# Patient Record
Sex: MALE | Race: OTHER | HISPANIC OR LATINO | URBAN - METROPOLITAN AREA
[De-identification: names, ages, dates, MRNs, and addresses within clinical notes are randomized per-mention and may not be internally consistent; named-entity substitution may affect disease eponyms.]

---

## 2019-01-03 ENCOUNTER — EMERGENCY (EMERGENCY)
Facility: HOSPITAL | Age: 34
LOS: 1 days | Discharge: HOME | End: 2019-01-03
Attending: EMERGENCY MEDICINE | Admitting: EMERGENCY MEDICINE
Payer: COMMERCIAL

## 2019-01-03 VITALS
WEIGHT: 244.93 LBS | OXYGEN SATURATION: 97 % | HEIGHT: 72 IN | DIASTOLIC BLOOD PRESSURE: 84 MMHG | TEMPERATURE: 98 F | SYSTOLIC BLOOD PRESSURE: 144 MMHG | HEART RATE: 67 BPM | RESPIRATION RATE: 16 BRPM

## 2019-01-03 VITALS
HEART RATE: 59 BPM | RESPIRATION RATE: 15 BRPM | DIASTOLIC BLOOD PRESSURE: 87 MMHG | SYSTOLIC BLOOD PRESSURE: 142 MMHG | OXYGEN SATURATION: 100 % | TEMPERATURE: 98 F

## 2019-01-03 LAB
ALBUMIN SERPL ELPH-MCNC: 4.1 G/DL — SIGNIFICANT CHANGE UP (ref 3.3–5)
ALP SERPL-CCNC: 74 U/L — SIGNIFICANT CHANGE UP (ref 30–120)
ALT FLD-CCNC: 30 U/L DA — SIGNIFICANT CHANGE UP (ref 10–60)
ANION GAP SERPL CALC-SCNC: 9 MMOL/L — SIGNIFICANT CHANGE UP (ref 5–17)
APTT BLD: 35.4 SEC — SIGNIFICANT CHANGE UP (ref 27.5–36.3)
AST SERPL-CCNC: 17 U/L — SIGNIFICANT CHANGE UP (ref 10–40)
BASOPHILS # BLD AUTO: 0.02 K/UL — SIGNIFICANT CHANGE UP (ref 0–0.2)
BASOPHILS NFR BLD AUTO: 0.3 % — SIGNIFICANT CHANGE UP (ref 0–2)
BILIRUB SERPL-MCNC: 0.3 MG/DL — SIGNIFICANT CHANGE UP (ref 0.2–1.2)
BUN SERPL-MCNC: 24 MG/DL — HIGH (ref 7–23)
CALCIUM SERPL-MCNC: 9.2 MG/DL — SIGNIFICANT CHANGE UP (ref 8.4–10.5)
CHLORIDE SERPL-SCNC: 104 MMOL/L — SIGNIFICANT CHANGE UP (ref 96–108)
CO2 SERPL-SCNC: 29 MMOL/L — SIGNIFICANT CHANGE UP (ref 22–31)
CREAT SERPL-MCNC: 0.85 MG/DL — SIGNIFICANT CHANGE UP (ref 0.5–1.3)
EOSINOPHIL # BLD AUTO: 0.26 K/UL — SIGNIFICANT CHANGE UP (ref 0–0.5)
EOSINOPHIL NFR BLD AUTO: 3.6 % — SIGNIFICANT CHANGE UP (ref 0–6)
GLUCOSE SERPL-MCNC: 103 MG/DL — HIGH (ref 70–99)
HCT VFR BLD CALC: 42.4 % — SIGNIFICANT CHANGE UP (ref 39–50)
HGB BLD-MCNC: 14.6 G/DL — SIGNIFICANT CHANGE UP (ref 13–17)
IMM GRANULOCYTES NFR BLD AUTO: 0.3 % — SIGNIFICANT CHANGE UP (ref 0–1.5)
INR BLD: 1.08 RATIO — SIGNIFICANT CHANGE UP (ref 0.88–1.16)
LYMPHOCYTES # BLD AUTO: 1.73 K/UL — SIGNIFICANT CHANGE UP (ref 1–3.3)
LYMPHOCYTES # BLD AUTO: 23.6 % — SIGNIFICANT CHANGE UP (ref 13–44)
MCHC RBC-ENTMCNC: 29.7 PG — SIGNIFICANT CHANGE UP (ref 27–34)
MCHC RBC-ENTMCNC: 34.4 GM/DL — SIGNIFICANT CHANGE UP (ref 32–36)
MCV RBC AUTO: 86.4 FL — SIGNIFICANT CHANGE UP (ref 80–100)
MONOCYTES # BLD AUTO: 0.65 K/UL — SIGNIFICANT CHANGE UP (ref 0–0.9)
MONOCYTES NFR BLD AUTO: 8.9 % — SIGNIFICANT CHANGE UP (ref 2–14)
NEUTROPHILS # BLD AUTO: 4.64 K/UL — SIGNIFICANT CHANGE UP (ref 1.8–7.4)
NEUTROPHILS NFR BLD AUTO: 63.3 % — SIGNIFICANT CHANGE UP (ref 43–77)
NRBC # BLD: 0 /100 WBCS — SIGNIFICANT CHANGE UP (ref 0–0)
PLATELET # BLD AUTO: 207 K/UL — SIGNIFICANT CHANGE UP (ref 150–400)
POTASSIUM SERPL-MCNC: 4.3 MMOL/L — SIGNIFICANT CHANGE UP (ref 3.5–5.3)
POTASSIUM SERPL-SCNC: 4.3 MMOL/L — SIGNIFICANT CHANGE UP (ref 3.5–5.3)
PROT SERPL-MCNC: 7.5 G/DL — SIGNIFICANT CHANGE UP (ref 6–8.3)
PROTHROM AB SERPL-ACNC: 11.8 SEC — SIGNIFICANT CHANGE UP (ref 10–12.9)
RBC # BLD: 4.91 M/UL — SIGNIFICANT CHANGE UP (ref 4.2–5.8)
RBC # FLD: 12.1 % — SIGNIFICANT CHANGE UP (ref 10.3–14.5)
SODIUM SERPL-SCNC: 142 MMOL/L — SIGNIFICANT CHANGE UP (ref 135–145)
WBC # BLD: 7.32 K/UL — SIGNIFICANT CHANGE UP (ref 3.8–10.5)
WBC # FLD AUTO: 7.32 K/UL — SIGNIFICANT CHANGE UP (ref 3.8–10.5)

## 2019-01-03 PROCEDURE — 36415 COLL VENOUS BLD VENIPUNCTURE: CPT

## 2019-01-03 PROCEDURE — 99284 EMERGENCY DEPT VISIT MOD MDM: CPT

## 2019-01-03 PROCEDURE — 70482 CT ORBIT/EAR/FOSSA W/O&W/DYE: CPT | Mod: 26

## 2019-01-03 PROCEDURE — 80053 COMPREHEN METABOLIC PANEL: CPT

## 2019-01-03 PROCEDURE — 70482 CT ORBIT/EAR/FOSSA W/O&W/DYE: CPT

## 2019-01-03 PROCEDURE — 85730 THROMBOPLASTIN TIME PARTIAL: CPT

## 2019-01-03 PROCEDURE — 85027 COMPLETE CBC AUTOMATED: CPT

## 2019-01-03 PROCEDURE — 85610 PROTHROMBIN TIME: CPT

## 2019-01-03 PROCEDURE — 99284 EMERGENCY DEPT VISIT MOD MDM: CPT | Mod: 25

## 2019-01-03 NOTE — ED ADULT TRIAGE NOTE - CHIEF COMPLAINT QUOTE
swelling pain left upper eyelid for 2 months. Worse past 2 weeks. " swelling pain left upper eyelid for 2 months. Worse past 2 weeks. Left eye 20/20, Right eye 20/30 "

## 2019-01-03 NOTE — ED PROVIDER NOTE - OBJECTIVE STATEMENT
34 y/o M pt w/ no significant PMHx presents to the ED c/o L eye swelling x 1 month, worse with pain x 2 weeks. Seen by eye doctor at start of symptoms was Rxd 2 week course of amoxicillin, finished 2 weeks ago. Has not taken any antibiotics since. Was seen again by eye doctor last week for worsening symptoms, referred to [facility] Dr. Musa. Saw Dr. Musa today, advised to come to ED for CT scan of orbit. Pt denies fever, itching, FB or any other complaints at this time. 32 y/o M pt w/ no significant PMHx presents to the ED c/o L eye swelling x 1 month, worse with pain x 2 weeks. Seen by eye doctor at start of symptoms was Rxd 2 week course of amoxicillin, finished 2 weeks ago. Has not taken any antibiotics since. Was seen again by eye doctor last week for worsening symptoms, referred to [facility] Dr. Musa. Saw Dr. Musa today, advised to come to ED for CT scan of orbit. To rule out posterior involvement lacrimal gland involvement possible IV antibiotic and subsequent immunosuppressive therapy.  Pt denies fever, itching, FB or any other complaints at this time.    Dr Musa [phone]

## 2019-01-03 NOTE — ED PROVIDER NOTE - EYES, MLM
Chief Complaint   Patient presents with     RECHECK     Post Liver TXP   Pt roomed, vitals, meds, and allergies reviewed with pt. Pt ready for provider.  Teddy George, CMA  
Pt denies any diarrhea. Pt does have intermittent constipation, but uses miralax to help. Pt denies any itching. Pt does have minimal swelling in LEs. Pt also has plantar fascitis that she has been dealing with for a year. Pt does not wear supportive shoes or inserts. Recommended she follow up with podiatry and have supportive shoes and inserts. Pt agreeable with plan.   
Pupils equal, round and reactive to light. Diplopia when looking to far L, conjunctiva slightly injected and swollen on lateral aspect of L eye, L eye lid erythematous and swollen, mostly upper lid.

## 2019-01-03 NOTE — ED ADULT NURSE NOTE - CHIEF COMPLAINT QUOTE
" swelling pain left upper eyelid for 2 months. Worse past 2 weeks. Left eye 20/20, Right eye 20/30 "

## 2019-01-03 NOTE — ED PROVIDER NOTE - PROGRESS NOTE DETAILS
Pt sent to ED with note from Dr. Musa ([facility]), states patient to go to ER for CT scan with and without contrast, r/o posterior involvement and document lacrimal gland involvement, IV pulse antibiotics, and subsequent immunosuppressive therapy. Patients is worst after using oral antibiotics. Demetri GLEZ contacted will call back Dr Avery on call 0  will call back soon Demetri GLEZ contacted will call back Dr Avery on call 2  will call back soon to see Dr Musa tomorrow

## 2019-01-04 ENCOUNTER — INPATIENT (INPATIENT)
Facility: HOSPITAL | Age: 34
LOS: 1 days | Discharge: HOME | DRG: 121 | End: 2019-01-06
Attending: INTERNAL MEDICINE | Admitting: INTERNAL MEDICINE
Payer: COMMERCIAL

## 2019-01-04 VITALS
SYSTOLIC BLOOD PRESSURE: 147 MMHG | HEART RATE: 65 BPM | TEMPERATURE: 98 F | WEIGHT: 145.06 LBS | OXYGEN SATURATION: 98 % | RESPIRATION RATE: 15 BRPM | DIASTOLIC BLOOD PRESSURE: 97 MMHG

## 2019-01-04 DIAGNOSIS — H05.012 CELLULITIS OF LEFT ORBIT: ICD-10-CM

## 2019-01-04 DIAGNOSIS — L04.9 ACUTE LYMPHADENITIS, UNSPECIFIED: ICD-10-CM

## 2019-01-04 LAB
ALBUMIN SERPL ELPH-MCNC: 4.3 G/DL — SIGNIFICANT CHANGE UP (ref 3.3–5)
ALP SERPL-CCNC: 75 U/L — SIGNIFICANT CHANGE UP (ref 30–120)
ALT FLD-CCNC: 29 U/L DA — SIGNIFICANT CHANGE UP (ref 10–60)
ANION GAP SERPL CALC-SCNC: 10 MMOL/L — SIGNIFICANT CHANGE UP (ref 5–17)
APPEARANCE UR: CLEAR — SIGNIFICANT CHANGE UP
APTT BLD: 36.5 SEC — SIGNIFICANT CHANGE UP (ref 28.5–37)
AST SERPL-CCNC: 15 U/L — SIGNIFICANT CHANGE UP (ref 10–40)
BASOPHILS # BLD AUTO: 0.02 K/UL — SIGNIFICANT CHANGE UP (ref 0–0.2)
BASOPHILS NFR BLD AUTO: 0.3 % — SIGNIFICANT CHANGE UP (ref 0–2)
BILIRUB SERPL-MCNC: 0.4 MG/DL — SIGNIFICANT CHANGE UP (ref 0.2–1.2)
BILIRUB UR-MCNC: NEGATIVE — SIGNIFICANT CHANGE UP
BUN SERPL-MCNC: 15 MG/DL — SIGNIFICANT CHANGE UP (ref 7–23)
CALCIUM SERPL-MCNC: 9 MG/DL — SIGNIFICANT CHANGE UP (ref 8.4–10.5)
CHLORIDE SERPL-SCNC: 102 MMOL/L — SIGNIFICANT CHANGE UP (ref 96–108)
CO2 SERPL-SCNC: 28 MMOL/L — SIGNIFICANT CHANGE UP (ref 22–31)
COLOR SPEC: YELLOW — SIGNIFICANT CHANGE UP
CREAT SERPL-MCNC: 0.87 MG/DL — SIGNIFICANT CHANGE UP (ref 0.5–1.3)
DIFF PNL FLD: NEGATIVE — SIGNIFICANT CHANGE UP
EOSINOPHIL # BLD AUTO: 0.24 K/UL — SIGNIFICANT CHANGE UP (ref 0–0.5)
EOSINOPHIL NFR BLD AUTO: 3.2 % — SIGNIFICANT CHANGE UP (ref 0–6)
GLUCOSE SERPL-MCNC: 85 MG/DL — SIGNIFICANT CHANGE UP (ref 70–99)
GLUCOSE UR QL: NEGATIVE MG/DL — SIGNIFICANT CHANGE UP
HCT VFR BLD CALC: 44.9 % — SIGNIFICANT CHANGE UP (ref 39–50)
HGB BLD-MCNC: 15.3 G/DL — SIGNIFICANT CHANGE UP (ref 13–17)
IMM GRANULOCYTES NFR BLD AUTO: 0.3 % — SIGNIFICANT CHANGE UP (ref 0–1.5)
INR BLD: 1.16 RATIO — SIGNIFICANT CHANGE UP (ref 0.88–1.16)
KETONES UR-MCNC: NEGATIVE — SIGNIFICANT CHANGE UP
LACTATE SERPL-SCNC: 1 MMOL/L — SIGNIFICANT CHANGE UP (ref 0.7–2)
LEUKOCYTE ESTERASE UR-ACNC: NEGATIVE — SIGNIFICANT CHANGE UP
LYMPHOCYTES # BLD AUTO: 1.9 K/UL — SIGNIFICANT CHANGE UP (ref 1–3.3)
LYMPHOCYTES # BLD AUTO: 25.5 % — SIGNIFICANT CHANGE UP (ref 13–44)
MCHC RBC-ENTMCNC: 29.3 PG — SIGNIFICANT CHANGE UP (ref 27–34)
MCHC RBC-ENTMCNC: 34.1 GM/DL — SIGNIFICANT CHANGE UP (ref 32–36)
MCV RBC AUTO: 86 FL — SIGNIFICANT CHANGE UP (ref 80–100)
MONOCYTES # BLD AUTO: 0.7 K/UL — SIGNIFICANT CHANGE UP (ref 0–0.9)
MONOCYTES NFR BLD AUTO: 9.4 % — SIGNIFICANT CHANGE UP (ref 2–14)
NEUTROPHILS # BLD AUTO: 4.58 K/UL — SIGNIFICANT CHANGE UP (ref 1.8–7.4)
NEUTROPHILS NFR BLD AUTO: 61.3 % — SIGNIFICANT CHANGE UP (ref 43–77)
NITRITE UR-MCNC: NEGATIVE — SIGNIFICANT CHANGE UP
NRBC # BLD: 0 /100 WBCS — SIGNIFICANT CHANGE UP (ref 0–0)
PH UR: 5 — SIGNIFICANT CHANGE UP (ref 5–8)
PLATELET # BLD AUTO: 222 K/UL — SIGNIFICANT CHANGE UP (ref 150–400)
POTASSIUM SERPL-MCNC: 3.8 MMOL/L — SIGNIFICANT CHANGE UP (ref 3.5–5.3)
POTASSIUM SERPL-SCNC: 3.8 MMOL/L — SIGNIFICANT CHANGE UP (ref 3.5–5.3)
PROT SERPL-MCNC: 7.8 G/DL — SIGNIFICANT CHANGE UP (ref 6–8.3)
PROT UR-MCNC: NEGATIVE MG/DL — SIGNIFICANT CHANGE UP
PROTHROM AB SERPL-ACNC: 12.7 SEC — SIGNIFICANT CHANGE UP (ref 10–12.9)
RBC # BLD: 5.22 M/UL — SIGNIFICANT CHANGE UP (ref 4.2–5.8)
RBC # FLD: 11.9 % — SIGNIFICANT CHANGE UP (ref 10.3–14.5)
SODIUM SERPL-SCNC: 140 MMOL/L — SIGNIFICANT CHANGE UP (ref 135–145)
SP GR SPEC: 1.01 — SIGNIFICANT CHANGE UP (ref 1.01–1.02)
UROBILINOGEN FLD QL: NEGATIVE MG/DL — SIGNIFICANT CHANGE UP
WBC # BLD: 7.41 K/UL — SIGNIFICANT CHANGE UP (ref 3.8–10.5)
WBC # FLD AUTO: 7.41 K/UL — SIGNIFICANT CHANGE UP (ref 3.8–10.5)

## 2019-01-04 PROCEDURE — 99284 EMERGENCY DEPT VISIT MOD MDM: CPT

## 2019-01-04 RX ORDER — PIPERACILLIN AND TAZOBACTAM 4; .5 G/20ML; G/20ML
3.38 INJECTION, POWDER, LYOPHILIZED, FOR SOLUTION INTRAVENOUS ONCE
Qty: 0 | Refills: 0 | Status: COMPLETED | OUTPATIENT
Start: 2019-01-04 | End: 2019-01-04

## 2019-01-04 RX ORDER — VANCOMYCIN HCL 1 G
1000 VIAL (EA) INTRAVENOUS EVERY 12 HOURS
Qty: 0 | Refills: 0 | Status: DISCONTINUED | OUTPATIENT
Start: 2019-01-04 | End: 2019-01-05

## 2019-01-04 RX ORDER — VANCOMYCIN HCL 1 G
1000 VIAL (EA) INTRAVENOUS ONCE
Qty: 0 | Refills: 0 | Status: COMPLETED | OUTPATIENT
Start: 2019-01-04 | End: 2019-01-04

## 2019-01-04 RX ORDER — PIPERACILLIN AND TAZOBACTAM 4; .5 G/20ML; G/20ML
3.38 INJECTION, POWDER, LYOPHILIZED, FOR SOLUTION INTRAVENOUS EVERY 8 HOURS
Qty: 0 | Refills: 0 | Status: DISCONTINUED | OUTPATIENT
Start: 2019-01-04 | End: 2019-01-05

## 2019-01-04 RX ADMIN — PIPERACILLIN AND TAZOBACTAM 200 GRAM(S): 4; .5 INJECTION, POWDER, LYOPHILIZED, FOR SOLUTION INTRAVENOUS at 18:45

## 2019-01-04 RX ADMIN — Medication 40 MILLIGRAM(S): at 19:28

## 2019-01-04 RX ADMIN — Medication 250 MILLIGRAM(S): at 19:10

## 2019-01-04 NOTE — ED PROVIDER NOTE - MEDICAL DECISION MAKING DETAILS
32 y/o M pt presents to the ED c/o worsening left upper eyelid swelling, pain, and redness for approximately 1 month. Was seen in ED yesterday for same sx, had CT orbital done. Will do labs then reassess.

## 2019-01-04 NOTE — CONSULT NOTE ADULT - SUBJECTIVE AND OBJECTIVE BOX
[facility] Ophthalmology Consult Note    HPI: 32yo healthy male has been experiencing L eyelid swelling x1mo. It started as painless upper lid swelling that is trace, went to [facility] and was prescribed 2wk course of amoxicillin which the patient completed. Then on week 3, patient started experiencing worsening of eyelid swelling assocated with pain on lateral gaze. No changes in vision. Pt returned to [facility], saw Dr. Musa who recommended CT orbit that showed lacrimal gland involvement and sent to ER for possible IV abx and immunosuppressive therapy. Throughout this month, pt has been afebrile. No cough, nightsweats, joint pain, muscle pain, abd pain, loss of appetite, or weight loss.      PMH: None  Meds: None  POcHx (including surgeries/lasers/trauma):  None  Drops: None  FamHx: None  Social Hx: None  Allergies: NKDA    ROS:  General (neg), Vision (per HPI), Head and Neck (neg), Pulm (neg), CV (neg), GI (neg),  (neg), Musculoskeletal (neg), Skin/Integ (neg), Neuro (neg), Endocrine (neg), Heme (neg), All/Immuno (neg)    Mood and Affect Appropriate ( x ),  Oriented to Time, Place, and Person x 3 ( x )    Ophthalmology Exam    Visual acuity near sc 20/20OU  Pupils: PERRL OU, no APD  Ttono: 16 OU  Extraocular movements (EOMs): Full OU, +pain lateral gaze OS, +diplopia far lateral gaze  Confrontational Visual Field (CVF):  Full OU  Color Plates: 12/12 OU    Pen Light Exam (PLE)  External:  Flat OU  Lids/Lashes/Lacrimal Ducts: OD flat, OS S-shaped upper lid edema with erythema, upper lid at level of corneal reflex. +palpable orbital lacrimal gland with enlarged palpebral lacrimal gland visualized  Sclera/Conjunctiva:  OD W+Q, OS 1+ injection laterally with mild overlying chemosis otherwise W+Q  Cornea: Cl OU  Anterior Chamber: D+F OU  Iris:  Flat OU  Lens:  Cl OU    Fundus Exam: dilated with 1% tropicamide and 2.5% phenylephrine @   Approval obtained from primary team for dilation  Patient aware that pupils can remained dilated for at least 4-6 hours  Exam performed with 20D lens    Vitreous: wnl OU  Cup/Disc: 0.4 OU  Macula:  wnl OU  Vessels:  wnl OU  Periphery: wnl OU    Diagnostic Testing:  CT orbit w wo con: Prominent left lacrimal gland with adjacent soft tissue swelling/edema.   Findings may represent dacroadenitis in the correct clinical setting.   Sarcoidosis, lymphoma and orbital pseudotumor could have similar   radiographic appearance. Clinical correlation and follow-up exam   recommended after appropriate treatment.    Afebrile  No leukocytosis    Assessment:      Plan:        Follow-Up:  Patient should follow up in the [facility] within 1 week of discharge.  [address] [place] [id number]  [phone] (practice) or [phone] (clinic)    S/D/W Dr Ivey (attending) [facility] Ophthalmology Consult Note    HPI: 34yo healthy male has been experiencing L eyelid swelling x1mo. It started as painless upper lid swelling that is trace, went to [facility] and was prescribed 2wk course of amoxicillin which the patient completed. Then on week 3, patient started experiencing worsening of eyelid swelling assocated with pain on lateral gaze. No changes in vision. Pt returned to [facility], saw Dr. Musa who recommended CT orbit that showed lacrimal gland involvement and sent to ER for possible IV abx and immunosuppressive therapy. Throughout this month, pt has been afebrile. No cough, nightsweats, joint pain, muscle pain, abd pain, loss of appetite, or weight loss.      PMH: None  Meds: None  POcHx (including surgeries/lasers/trauma):  None  Drops: None  FamHx: None  Social Hx: None  Allergies: NKDA    ROS:  General (neg), Vision (per HPI), Head and Neck (neg), Pulm (neg), CV (neg), GI (neg),  (neg), Musculoskeletal (neg), Skin/Integ (neg), Neuro (neg), Endocrine (neg), Heme (neg), All/Immuno (neg)    Mood and Affect Appropriate ( x ),  Oriented to Time, Place, and Person x 3 ( x )    Ophthalmology Exam    Visual acuity near sc 20/20 OU  Pupils: PERRL OU, no APD  Ttono: 16 OU  Extraocular movements (EOMs): Full OU, +pain lateral gaze OS, +diplopia far lateral gaze  Confrontational Visual Field (CVF):  Full OU  Color Plates: 12/12 OU    Pen Light Exam (PLE)  External:  Flat OU, no preauricular or submandibular lymphadenopathy  Lids/Lashes/Lacrimal Ducts: OD flat, OS S-shaped upper lid edema with erythema, upper lid at level of corneal reflex. +palpabl orbital lacrimal gland with enlarged palpebral lacrimal gland visualized  Sclera/Conjunctiva:  OD W+Q, OS 1+ injection laterally with mild overlying chemosis otherwise W+Q  Cornea: Cl OU  Anterior Chamber: D+F OU  Iris:  Flat OU  Lens:  Cl OU    Fundus Exam: dilated with 1% tropicamide and 2.5% phenylephrine @   Approval obtained from primary team for dilation  Patient aware that pupils can remained dilated for at least 4-6 hours  Exam performed with 20D lens    Vitreous: wnl OU  Cup/Disc: pink and sharp 0.2 OU  Macula:  wnl OU  Vessels:  wnl OU  Periphery: wnl OU    Diagnostic Testing:  CT orbit w wo con: Prominent left lacrimal gland with adjacent soft tissue swelling/edema.   Findings may represent dacroadenitis in the correct clinical setting.   Sarcoidosis, lymphoma and orbital pseudotumor could have similar   radiographic appearance. Clinical correlation and follow-up exam   recommended after appropriate treatment.    Afebrile  No leukocytosis    Assessment:  34yo M with L eyelid swelling x1mo, worsened over past 2wks associated with 2wk pain. Exam significant for S-shaped upper eyelid swelling, lateral conjunctival injection with chemosis, and enlarged palpable gland. CT orbit showed prominent L lacrimal gland with adjacent soft tissue swelling/edema. DDx: IgG4-related disease, sarcoidosis, lymphoma, tumor. Low suspicion for infectious etiology given duration of symptom, no leukocytosis, and afebrile    Plan:  - please obtain: angiotensin converting enzyme, cANCA, pANCA, lyme titers, syphilis titers, LDH, IgG4/IgG levels, quantiferon gold  - please obtain CXR to rule out sarcoidosis   - 1 dose of IV abx  - will call patient this Sunday for time of follow up appt    GIANNA Chao (oculoplastics) [facility] Ophthalmology Consult Note    HPI: 32yo healthy male has been experiencing L eyelid swelling x1mo. It started as painless upper lid swelling that is trace, went to [facility] and was prescribed 2wk course of amoxicillin which the patient completed. Then on week 3, patient started experiencing worsening of eyelid swelling assocated with pain on lateral gaze. No changes in vision. Pt returned to [facility], saw Dr. Musa who recommended CT orbit that showed lacrimal gland involvement and sent to ER for possible IV abx and immunosuppressive therapy. Throughout this month, pt has been afebrile. No cough, nightsweats, joint pain, muscle pain, abd pain, loss of appetite, or weight loss.      PMH: None  Meds: None  POcHx (including surgeries/lasers/trauma):  None  Drops: None  FamHx: None  Social Hx: None  Allergies: NKDA    ROS:  General (neg), Vision (per HPI), Head and Neck (neg), Pulm (neg), CV (neg), GI (neg),  (neg), Musculoskeletal (neg), Skin/Integ (neg), Neuro (neg), Endocrine (neg), Heme (neg), All/Immuno (neg)    Mood and Affect Appropriate ( x ),  Oriented to Time, Place, and Person x 3 ( x )    Ophthalmology Exam    Visual acuity near sc 20/20 OU  Pupils: PERRL OU, no APD  Ttono: 16 OU  Extraocular movements (EOMs): Full OU, +pain lateral gaze OS, +diplopia far lateral gaze  Confrontational Visual Field (CVF):  Full OU  Color Plates: 12/12 OU    Pen Light Exam (PLE)  External:  Flat OU, no preauricular or submandibular lymphadenopathy  Lids/Lashes/Lacrimal Ducts: OD flat, OS S-shaped upper lid edema with erythema, upper lid at level of corneal reflex. +palpabl orbital lacrimal gland with enlarged palpebral lacrimal gland visualized  Sclera/Conjunctiva:  OD W+Q, OS 1+ injection laterally with mild overlying chemosis otherwise W+Q  Cornea: Cl OU  Anterior Chamber: D+F OU  Iris:  Flat OU  Lens:  Cl OU    Fundus Exam: dilated with 1% tropicamide and 2.5% phenylephrine @   Approval obtained from primary team for dilation  Patient aware that pupils can remained dilated for at least 4-6 hours  Exam performed with 20D lens    Vitreous: wnl OU  Cup/Disc: pink and sharp 0.2 OU  Macula:  wnl OU  Vessels:  wnl OU  Periphery: wnl OU    Diagnostic Testing:  CT orbit w wo con: Prominent left lacrimal gland with adjacent soft tissue swelling/edema.   Findings may represent dacroadenitis in the correct clinical setting.   Sarcoidosis, lymphoma and orbital pseudotumor could have similar   radiographic appearance. Clinical correlation and follow-up exam   recommended after appropriate treatment.    Afebrile  No leukocytosis    Assessment:  32yo M with L eyelid swelling x1mo, worsened over past 2wks associated with 2wk pain. Exam significant for S-shaped upper eyelid swelling, lateral conjunctival injection with chemosis, and enlarged palpable gland. CT orbit showed prominent L lacrimal gland with adjacent soft tissue swelling/edema. DDx: IgG4-related disease, sarcoidosis, lymphoma, tumor. Low suspicion for infectious etiology given duration of symptom, no leukocytosis, and afebrile    Plan:  - please obtain: angiotensin converting enzyme, cANCA, pANCA, lyme titers, syphilis titers, LDH, IgG4/IgG levels, quantiferon gold  - please obtain CXR to rule out sarcoidosis   - 1 dose of IV abx, may discharge after  - please provide patient with medical clearance for lacrimal gland biopsy (will likely be this monday or tuesday)  - will call patient over weekend for time of follow up appt  - plan discussed with patient and ER team     DW Dr. Chao (oculoplastics)

## 2019-01-04 NOTE — ED ADULT NURSE NOTE - NSIMPLEMENTINTERV_GEN_ALL_ED
Implemented All Universal Safety Interventions:  [place] to call system. Call bell, personal items and telephone within reach. Instruct patient to call for assistance. Room bathroom lighting operational. Non-slip footwear when patient is off stretcher. Physically safe environment: no spills, clutter or unnecessary equipment. Stretcher in lowest position, wheels locked, appropriate side rails in place.

## 2019-01-04 NOTE — H&P ADULT - PROBLEM SELECTOR PLAN 1
ct from 1/3/19 noted  iv abx  iv steroids  id eval and opthalmology eval called as his regular opthalmology does not come here

## 2019-01-04 NOTE — H&P ADULT - HISTORY OF PRESENT ILLNESS
32 y/o M pt presents to the ED c/o worsening left upper eyelid swelling, pain, and redness for approximately 1 month. Confirms double vision when looking left laterally. Pt was seen in ED yesterday for same sx, had CT orbital done which showed signs of dacroadenitis. Was Rx Amoxicillin 1 month ago for infection, finished abx 2 weeks ago with no improvement in sx. He was sent to ED from PMD/opthalmologist for IV Abx. Denies fever. No further complaints at this time. No improvement with oral abx.

## 2019-01-04 NOTE — ED ADULT TRIAGE NOTE - CHIEF COMPLAINT QUOTE
"I'm back to have my eye checked. My doctor wants me to have IV antibiotics for my swollen left eye." Eye redness and swelling for a month.

## 2019-01-04 NOTE — ED PROVIDER NOTE - OBJECTIVE STATEMENT
34 y/o M pt presents to the ED c/o worsening left upper eyelid swelling, pain, and redness for approximately 1 month. Confirms double vision when looking left laterally. Pt was seen in ED yesterday for same sx, had CT orbital done which showed signs of dacroadenitis. Was Rx Amoxicillin 1 month ago for infection, finished abx 2 weeks ago with no improvement in sx. He was sent to ED from PMD for IV Abx. Denies fever. No further complaints at this time.

## 2019-01-04 NOTE — ED ADULT NURSE NOTE - OBJECTIVE STATEMENT
pains and swelling to left eye upper lid swollen and pink and slight swelling midline under lower lid

## 2019-01-05 LAB
ANION GAP SERPL CALC-SCNC: 10 MMOL/L — SIGNIFICANT CHANGE UP (ref 5–17)
BUN SERPL-MCNC: 20 MG/DL — SIGNIFICANT CHANGE UP (ref 7–23)
CALCIUM SERPL-MCNC: 9.5 MG/DL — SIGNIFICANT CHANGE UP (ref 8.4–10.5)
CHLORIDE SERPL-SCNC: 103 MMOL/L — SIGNIFICANT CHANGE UP (ref 96–108)
CO2 SERPL-SCNC: 24 MMOL/L — SIGNIFICANT CHANGE UP (ref 22–31)
CREAT SERPL-MCNC: 1.09 MG/DL — SIGNIFICANT CHANGE UP (ref 0.5–1.3)
CRP SERPL-MCNC: 0.21 MG/DL — SIGNIFICANT CHANGE UP (ref 0–0.4)
ERYTHROCYTE [SEDIMENTATION RATE] IN BLOOD: 2 MM/HR — SIGNIFICANT CHANGE UP (ref 0–9)
GLUCOSE SERPL-MCNC: 157 MG/DL — HIGH (ref 70–99)
HCT VFR BLD CALC: 43 % — SIGNIFICANT CHANGE UP (ref 39–50)
HGB BLD-MCNC: 14.7 G/DL — SIGNIFICANT CHANGE UP (ref 13–17)
MCHC RBC-ENTMCNC: 29.5 PG — SIGNIFICANT CHANGE UP (ref 27–34)
MCHC RBC-ENTMCNC: 34.2 GM/DL — SIGNIFICANT CHANGE UP (ref 32–36)
MCV RBC AUTO: 86.2 FL — SIGNIFICANT CHANGE UP (ref 80–100)
NRBC # BLD: 0 /100 WBCS — SIGNIFICANT CHANGE UP (ref 0–0)
PLATELET # BLD AUTO: 247 K/UL — SIGNIFICANT CHANGE UP (ref 150–400)
POTASSIUM SERPL-MCNC: 4.5 MMOL/L — SIGNIFICANT CHANGE UP (ref 3.5–5.3)
POTASSIUM SERPL-SCNC: 4.5 MMOL/L — SIGNIFICANT CHANGE UP (ref 3.5–5.3)
RBC # BLD: 4.99 M/UL — SIGNIFICANT CHANGE UP (ref 4.2–5.8)
RBC # FLD: 11.8 % — SIGNIFICANT CHANGE UP (ref 10.3–14.5)
SODIUM SERPL-SCNC: 137 MMOL/L — SIGNIFICANT CHANGE UP (ref 135–145)
WBC # BLD: 9.63 K/UL — SIGNIFICANT CHANGE UP (ref 3.8–10.5)
WBC # FLD AUTO: 9.63 K/UL — SIGNIFICANT CHANGE UP (ref 3.8–10.5)

## 2019-01-05 PROCEDURE — 71045 X-RAY EXAM CHEST 1 VIEW: CPT | Mod: 26

## 2019-01-05 RX ORDER — VANCOMYCIN HCL 1 G
500 VIAL (EA) INTRAVENOUS ONCE
Qty: 0 | Refills: 0 | Status: COMPLETED | OUTPATIENT
Start: 2019-01-05 | End: 2019-01-05

## 2019-01-05 RX ORDER — VANCOMYCIN HCL 1 G
1500 VIAL (EA) INTRAVENOUS EVERY 12 HOURS
Qty: 0 | Refills: 0 | Status: DISCONTINUED | OUTPATIENT
Start: 2019-01-05 | End: 2019-01-06

## 2019-01-05 RX ADMIN — Medication 250 MILLIGRAM(S): at 05:34

## 2019-01-05 RX ADMIN — PIPERACILLIN AND TAZOBACTAM 25 GRAM(S): 4; .5 INJECTION, POWDER, LYOPHILIZED, FOR SOLUTION INTRAVENOUS at 02:22

## 2019-01-05 RX ADMIN — Medication 300 MILLIGRAM(S): at 17:57

## 2019-01-05 RX ADMIN — Medication 100 MILLIGRAM(S): at 09:44

## 2019-01-05 RX ADMIN — Medication 40 MILLIGRAM(S): at 05:34

## 2019-01-05 NOTE — PROGRESS NOTE ADULT - PROBLEM SELECTOR PLAN 1
ct from 1/3/19 noted  iv abx as per- adjusted this am  iv steroids  opthalmology lisa called as his regular opthalmology does not come here ct from 1/3/19 noted  iv abx as per- adjusted this am  iv steroids stopped as per my discussion with ophthalmolgy  opthalmology lisa noted - consult in chart,  labs ordered per ophthalmology recommendation  cxr for pre op  likely lacrimal duct bx on monday or tuesday - discussed with opthalmology

## 2019-01-05 NOTE — PROGRESS NOTE ADULT - PROBLEM SELECTOR PLAN 2
warm compress to left eye  iv abx  iv steroids  ophathalmology eval recalled warm compress to left eye  iv abx  ophathalmology eval noted  for lacrimal duct bx

## 2019-01-05 NOTE — CONSULT NOTE ADULT - SUBJECTIVE AND OBJECTIVE BOX
Infectious Diseases Consult by Mckinley George MD    Reason for Consult :    HPI:  34 y/o M pt presents to the ED c/o worsening left upper eyelid swelling, pain, and redness for approximately 1 month. Confirms double vision when looking left laterally. Pt was seen in ED yesterday for same sx, had CT orbital done which showed signs of dacroadenitis. Was Rx Amoxicillin 1 month ago for infection, finished abx 2 weeks ago with no improvement in sx. He was sent to ED from PMD/opthalmologist Dr. Musa  for IV Abx. Denies fever. No further complaints at this time. No improvement with oral abx.     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 He has been followed by opthalmology as out patient, he was seen by ophthalmology in ER but no Note in place     Past Medical & Surgical Hx:  PAST MEDICAL & SURGICAL HISTORY:  No pertinent past medical history  No significant past surgical history      Social History-- , works for [employer] in construction   EtOH: denies   Tobacco: denies   Drug Use: denies       FAMILY HISTORY:  No pertinent family history in first degree relatives      Allergies    No Known Allergies    Intolerances        Home/ Out patient  Medications :  Home Medications:      Current Inpatient Medications :    ANTIBIOTICS:   piperacillin/tazobactam IVPB. 3.375 Gram(s) IV Intermittent every 8 hours  vancomycin  IVPB 1000 milliGRAM(s) IV Intermittent every 12 hours      OTHER RELEVANT MEDICATIONS :  methylPREDNISolone sodium succinate Injectable 40 milliGRAM(s) IV Push daily      ROS:  Unable to obtain due to :     ROS:  CONSTITUTIONAL:  Negative fever or chills, feels well, good appetite  EYES:  Negative  blurry vision or double vision  CARDIOVASCULAR:  Negative for chest pain or palpitations  RESPIRATORY:  Negative for cough, wheezing, or SOB   GASTROINTESTINAL:  Negative for nausea, vomiting, diarrhea, constipation, or abdominal pain  GENITOURINARY:  Negative frequency, urgency , dysuria or hematuria   NEUROLOGIC:  No headache, confusion, dizziness, lightheadedness  All other systems were reviewed and are negative          I&O's Detail      Physical Exam:  Vital Signs Last 24 Hrs  T(C): 36.7 (2019 08:10), Max: 37 (2019 19:04)  T(F): 98.1 (2019 08:10), Max: 98.6 (2019 19:04)  HR: 62 (2019 08:10) (62 - 84)  BP: 133/75 (2019 08:10) (120/68 - 147/97)  BP(mean): --  RR: 18 (2019 08:10) (15 - 18)  SpO2: 98% (2019 08:10) (95% - 99%)    Weight (kg): 65.8 (-04 @ 17:12)    General: well developed well nourished, in no acute distress  Eyes: sclera anicteric, pupils equal and reactive to light, STS of left upper eyelid, diplopia on left lateral gaze, no swelling of the eye lashes   ENMT: buccal mucosa moist, pharynx not injected  Neck: supple, trachea midline  Lungs: clear, no wheeze/rhonchi  Cardiovascular: regular rate and rhythm, S1 S2  Abdomen: soft, nontender, no organomegaly present, bowel sounds normal  Neurological:  alert and oriented x3, Cranial Nerves II-XII grossly intact  Skin:no increased ecchymosis/petechiae/purpura  Lymph Nodes: no palpable cervical/supraclavicular lymph nodes enlargements  Extremities: no cyanosis/clubbing/edema    Labs:                            14.7   9.63   )----------(  247       ( 2019 08:06 )               43.0      140    |  102    |  15     ----------------------------<  85         ( 2019 19:03 )  3.8     |  28     |  0.87     Ca    9.0        ( 2019 19:03 )    TPro  7.8    /  Alb  4.3    /  TBili  0.4    /  DBili  x      /  AST  15     /  ALT  29     /  AlkPhos  75     ( 2019 19:03 )    LIVER FUNCTIONS - ( 2019 19:03 )  Alb: 4.3 g/dL / Pro: 7.8 g/dL / ALK PHOS: 75 U/L / ALT: 29 U/L DA / AST: 15 U/L / GGT: x           PT/INR -  12.7 sec / 1.16 ratio   ( 2019 19:03 )       PTT -  36.5 sec   ( 2019 19:03 )  CAPILLARY BLOOD GLUCOSE        Urinalysis Basic - ( 2019 19:03 )    Color: Yellow / Appearance: Clear / S.015 / pH: x  Gluc: x / Ketone: Negative  / Bili: Negative / Urobili: Negative mg/dL   Blood: x / Protein: Negative mg/dL / Nitrite: Negative   Leuk Esterase: Negative / RBC: x / WBC x   Sq Epi: x / Non Sq Epi: x / Bacteria: x          RADIOLOGY & ADDITIONAL STUDIES:  CT Orbit w/wo IV Cont (19 @ 17:43) >    FINDINGS:  The globes are intact. The left lacrimal gland is enlarged. There is left   periorbital soft tissue swelling/edema. No extension to the left   retrobulbar fat.    Retention cyst/polyp right maxillary sinus noted    IMPRESSION:  Prominent left lacrimal gland with adjacent soft tissue swelling/edema.   Findings may represent dacroadenitis in the correct clinical setting.   Sarcoidosis, lymphoma and orbital pseudotumor could have similar   radiographic appearance. Clinical correlation and follow-up exam   recommended after appropriate treatment.      Assessment :     33 year old male admitted with recurrent cellulitis left eyelid, not responding to po antibiotics , CT scan of orbit suspicious for left dacrocystitis or  dacroadenitis . Likely pathogen would be strep ot Staph , especially MRSA . he has diplopia on left lateral gaze .     Plan:  - will increase Vancomycin to 1500 mg q 12 based on his weigh of 109 kg   - dc Zosyn as not needed   - continue with warm soaks to left eye q 4 hours   - needs opthalmology follow up   - may need biopsy of lacrimal gland to rule out other pathology     Continue with present regime .  Appropriate use of antibiotics and adverse effects reviewed.      I have discussed the above plan of care with patient in detail. He expressed understanding of the treatment plan . Risks, benefits and alternatives discussed in detail. I have asked if he has any questions or concerns and appropriately addressed them to the best of my ability .      > 55  minutes spent in direct patient care reviewing  the notes, lab data/ imaging , discussion with multidisciplinary team. All questions were addressed and answered to the best of my capacity .    Thank you for allowing me to participate in the care of your patient .      Mckinley George MD  [phone]

## 2019-01-05 NOTE — PROGRESS NOTE ADULT - SUBJECTIVE AND OBJECTIVE BOX
Patient is a 33y old  Male who presents with a chief complaint of left eye worsening swelling and pain (2019 08:49)      INTERVAL HPI/OVERNIGHT EVENTS: id consult noted, eye improved today    MEDICATIONS  (STANDING):  methylPREDNISolone sodium succinate Injectable 40 milliGRAM(s) IV Push daily  vancomycin  IVPB 1500 milliGRAM(s) IV Intermittent every 12 hours    MEDICATIONS  (PRN):      Allergies    No Known Allergies    Intolerances        REVIEW OF SYSTEMS:  CONSTITUTIONAL: No fever, weight loss, or fatigue  EYES: less swelling and pain  ENMT:  No difficulty hearing, tinnitus, vertigo; No sinus or throat pain  NECK: No pain or stiffness  RESPIRATORY: No cough, wheezing, chills or hemoptysis; No shortness of breath  CARDIOVASCULAR: No chest pain, palpitations, dizziness  GASTROINTESTINAL: No abdominal or epigastric pain. No nausea, vomiting, or hematemesis; No diarrhea or constipation. No melena or hematochezia.  GENITOURINARY: No dysuria, frequency, hematuria, or incontinence  NEUROLOGICAL: No headaches, memory loss, loss of strength, numbness, or tremors  SKIN: No itching, burning  LYMPH NODES: No enlarged glands  MUSCULOSKELETAL: No joint pain or swelling; No muscle, back, or extremity pain  PSYCHIATRIC: No depression, mood swings  HEME/LYMPH: No easy bruising, or bleeding gums  ALLERGY AND IMMUNOLOGIC: No hives    Vital Signs Last 24 Hrs  T(C): 36.7 (2019 08:10), Max: 37 (2019 19:04)  T(F): 98.1 (2019 08:10), Max: 98.6 (2019 19:04)  HR: 62 (2019 08:10) (62 - 84)  BP: 133/75 (2019 08:10) (120/68 - 147/97)  BP(mean): --  RR: 18 (2019 08:10) (15 - 18)  SpO2: 98% (2019 08:10) (95% - 99%)    PHYSICAL EXAM:  GENERAL: NAD, well-groomed, well-developed  HEAD:  Atraumatic, Normocephalic  EYES: eomi, less periorbital swelling and redness in left eye, able to open eye lid today  ENMT: No tonsillar erythema, exudates, or enlargement   NECK: Supple, No JVD  NERVOUS SYSTEM:  Alert & Oriented X3, Good concentration  CHEST/LUNG: Clear to auscultation bilaterally; No rales, rhonchi, wheezing  HEART: Regular rate and rhythm  ABDOMEN: Soft, Nontender, Nondistended; Bowel sounds present  EXTREMITIES:  2+ Peripheral Pulses   LYMPH: No lymphadenopathy noted  SKIN: No rashes     LABS:                        14.7   9.63  )-----------( 247      ( 2019 08:06 )             43.0     2019 08:05    137    |  103    |  20     ----------------------------<  157    4.5     |  24     |  1.09     Ca    9.5        2019 08:05    TPro  7.8    /  Alb  4.3    /  TBili  0.4    /  DBili  x      /  AST  15     /  ALT  29     /  AlkPhos  75     2019 19:03    PT/INR - ( 2019 19:03 )   PT: 12.7 sec;   INR: 1.16 ratio         PTT - ( 2019 19:03 )  PTT:36.5 sec  Urinalysis Basic - ( 2019 19:03 )    Color: Yellow / Appearance: Clear / S.015 / pH: x  Gluc: x / Ketone: Negative  / Bili: Negative / Urobili: Negative mg/dL   Blood: x / Protein: Negative mg/dL / Nitrite: Negative   Leuk Esterase: Negative / RBC: x / WBC x   Sq Epi: x / Non Sq Epi: x / Bacteria: x      CAPILLARY BLOOD GLUCOSE                RADIOLOGY & ADDITIONAL TESTS:  no new      Consultant(s) Notes Reviewed:  [x ] YES  [ ] NO    Care Discussed with Consultants/Other Providers [x ] YES  [ ] NO    Advanced care planning discussed with patient and family, advanced care planning forms reviewed, discussed, and completed.  20 minutes spent.

## 2019-01-06 ENCOUNTER — TRANSCRIPTION ENCOUNTER (OUTPATIENT)
Age: 34
End: 2019-01-06

## 2019-01-06 VITALS
RESPIRATION RATE: 18 BRPM | DIASTOLIC BLOOD PRESSURE: 78 MMHG | HEART RATE: 63 BPM | OXYGEN SATURATION: 98 % | TEMPERATURE: 98 F | SYSTOLIC BLOOD PRESSURE: 106 MMHG

## 2019-01-06 LAB
ANION GAP SERPL CALC-SCNC: 6 MMOL/L — SIGNIFICANT CHANGE UP (ref 5–17)
BASOPHILS # BLD AUTO: 0.03 K/UL — SIGNIFICANT CHANGE UP (ref 0–0.2)
BASOPHILS NFR BLD AUTO: 0.3 % — SIGNIFICANT CHANGE UP (ref 0–2)
BUN SERPL-MCNC: 20 MG/DL — SIGNIFICANT CHANGE UP (ref 7–23)
CALCIUM SERPL-MCNC: 8.6 MG/DL — SIGNIFICANT CHANGE UP (ref 8.4–10.5)
CHLORIDE SERPL-SCNC: 105 MMOL/L — SIGNIFICANT CHANGE UP (ref 96–108)
CO2 SERPL-SCNC: 29 MMOL/L — SIGNIFICANT CHANGE UP (ref 22–31)
CREAT SERPL-MCNC: 0.94 MG/DL — SIGNIFICANT CHANGE UP (ref 0.5–1.3)
CULTURE RESULTS: NO GROWTH — SIGNIFICANT CHANGE UP
EOSINOPHIL # BLD AUTO: 0.15 K/UL — SIGNIFICANT CHANGE UP (ref 0–0.5)
EOSINOPHIL NFR BLD AUTO: 1.7 % — SIGNIFICANT CHANGE UP (ref 0–6)
GLUCOSE SERPL-MCNC: 103 MG/DL — HIGH (ref 70–99)
HCT VFR BLD CALC: 40 % — SIGNIFICANT CHANGE UP (ref 39–50)
HGB BLD-MCNC: 13.5 G/DL — SIGNIFICANT CHANGE UP (ref 13–17)
IMM GRANULOCYTES NFR BLD AUTO: 0.2 % — SIGNIFICANT CHANGE UP (ref 0–1.5)
LDH SERPL L TO P-CCNC: 515 U/L — HIGH (ref 50–242)
LYMPHOCYTES # BLD AUTO: 2.7 K/UL — SIGNIFICANT CHANGE UP (ref 1–3.3)
LYMPHOCYTES # BLD AUTO: 30.1 % — SIGNIFICANT CHANGE UP (ref 13–44)
MCHC RBC-ENTMCNC: 29.3 PG — SIGNIFICANT CHANGE UP (ref 27–34)
MCHC RBC-ENTMCNC: 33.8 GM/DL — SIGNIFICANT CHANGE UP (ref 32–36)
MCV RBC AUTO: 87 FL — SIGNIFICANT CHANGE UP (ref 80–100)
MONOCYTES # BLD AUTO: 0.79 K/UL — SIGNIFICANT CHANGE UP (ref 0–0.9)
MONOCYTES NFR BLD AUTO: 8.8 % — SIGNIFICANT CHANGE UP (ref 2–14)
NEUTROPHILS # BLD AUTO: 5.28 K/UL — SIGNIFICANT CHANGE UP (ref 1.8–7.4)
NEUTROPHILS NFR BLD AUTO: 58.9 % — SIGNIFICANT CHANGE UP (ref 43–77)
NRBC # BLD: 0 /100 WBCS — SIGNIFICANT CHANGE UP (ref 0–0)
PLATELET # BLD AUTO: 206 K/UL — SIGNIFICANT CHANGE UP (ref 150–400)
POTASSIUM SERPL-MCNC: 3.5 MMOL/L — SIGNIFICANT CHANGE UP (ref 3.5–5.3)
POTASSIUM SERPL-SCNC: 3.5 MMOL/L — SIGNIFICANT CHANGE UP (ref 3.5–5.3)
RBC # BLD: 4.6 M/UL — SIGNIFICANT CHANGE UP (ref 4.2–5.8)
RBC # FLD: 11.9 % — SIGNIFICANT CHANGE UP (ref 10.3–14.5)
SODIUM SERPL-SCNC: 140 MMOL/L — SIGNIFICANT CHANGE UP (ref 135–145)
SPECIMEN SOURCE: SIGNIFICANT CHANGE UP
VANCOMYCIN TROUGH SERPL-MCNC: 3.1 UG/ML — LOW (ref 10–20)
WBC # BLD: 8.97 K/UL — SIGNIFICANT CHANGE UP (ref 3.8–10.5)
WBC # FLD AUTO: 8.97 K/UL — SIGNIFICANT CHANGE UP (ref 3.8–10.5)

## 2019-01-06 PROCEDURE — 87040 BLOOD CULTURE FOR BACTERIA: CPT

## 2019-01-06 PROCEDURE — 85027 COMPLETE CBC AUTOMATED: CPT

## 2019-01-06 PROCEDURE — 82787 IGG 1 2 3 OR 4 EACH: CPT

## 2019-01-06 PROCEDURE — 86140 C-REACTIVE PROTEIN: CPT

## 2019-01-06 PROCEDURE — 99285 EMERGENCY DEPT VISIT HI MDM: CPT | Mod: 25

## 2019-01-06 PROCEDURE — 71045 X-RAY EXAM CHEST 1 VIEW: CPT

## 2019-01-06 PROCEDURE — 81003 URINALYSIS AUTO W/O SCOPE: CPT

## 2019-01-06 PROCEDURE — 80053 COMPREHEN METABOLIC PANEL: CPT

## 2019-01-06 PROCEDURE — 86593 SYPHILIS TEST NON-TREP QUANT: CPT

## 2019-01-06 PROCEDURE — 86618 LYME DISEASE ANTIBODY: CPT

## 2019-01-06 PROCEDURE — 80048 BASIC METABOLIC PNL TOTAL CA: CPT

## 2019-01-06 PROCEDURE — 85652 RBC SED RATE AUTOMATED: CPT

## 2019-01-06 PROCEDURE — 36415 COLL VENOUS BLD VENIPUNCTURE: CPT

## 2019-01-06 PROCEDURE — 85610 PROTHROMBIN TIME: CPT

## 2019-01-06 PROCEDURE — 82164 ANGIOTENSIN I ENZYME TEST: CPT

## 2019-01-06 PROCEDURE — 80202 ASSAY OF VANCOMYCIN: CPT

## 2019-01-06 PROCEDURE — 83605 ASSAY OF LACTIC ACID: CPT

## 2019-01-06 PROCEDURE — 86780 TREPONEMA PALLIDUM: CPT

## 2019-01-06 PROCEDURE — 87086 URINE CULTURE/COLONY COUNT: CPT

## 2019-01-06 PROCEDURE — 85730 THROMBOPLASTIN TIME PARTIAL: CPT

## 2019-01-06 PROCEDURE — 83615 LACTATE (LD) (LDH) ENZYME: CPT

## 2019-01-06 PROCEDURE — 86036 ANCA SCREEN EACH ANTIBODY: CPT

## 2019-01-06 RX ADMIN — Medication 300 MILLIGRAM(S): at 07:14

## 2019-01-06 NOTE — DISCHARGE NOTE ADULT - CARE PLAN
Principal Discharge DX:	Adenitis, acute  Goal:	remain infection and pain free  Assessment and plan of treatment:	finish course of abx  fu with opthalmology as advised for duct biopsy - they will call you tonight  Secondary Diagnosis:	Orbital cellulitis on left  Assessment and plan of treatment:	finish course of abx  fu with biospy as discussed

## 2019-01-06 NOTE — DISCHARGE NOTE ADULT - PLAN OF CARE
remain infection and pain free finish course of abx  fu with opthalmology as advised for duct biopsy - they will call you tonight finish course of abx  fu with biospy as discussed

## 2019-01-06 NOTE — DISCHARGE NOTE ADULT - HOSPITAL COURSE
admitted for left eye cellulitis and adenitis - sent by his regular ophthalmolgoist for further evaluation and abx. seen by house ophthalmoligst and id, started on iv abx and steroids with improvement, recommend outpt lacrimal duct bx to be done at [facility] clinic on monday, - discussed with dr pascual opthalmology who will arrange for outpt bx. pt stable and going home with oral abx and outpt fu.  >30 minutes spent on discharge

## 2019-01-06 NOTE — PROGRESS NOTE ADULT - SUBJECTIVE AND OBJECTIVE BOX
ID progress note     Name: TARA ALMONTE  Age: 33y  Gender: Male  MRN: [id number]    Interval History-- Events notd, he feels increased pressure and swelling left eye, placed n warm compresses. he is afebrile . Opthalmology evaluation noted   Notes reviewed    Past Medical History--  No pertinent past medical history  No significant past surgical history      For details regarding the patient's social history, family history, and other miscellaneous elements, please refer the initial infectious diseases consultation and/or the admitting history and physical examination for this admission.    Allergies--  Allergies    No Known Allergies    Intolerances        Medications--  Antibiotics:  vancomycin  IVPB 1500 milliGRAM(s) IV Intermittent every 12 hours    Immunologic:    Other:      Review of Systems--  A 10-point review of systems was obtained.     Pertinent positives and negatives--  Constitutional: No fevers. No Chills. No Rigors.   Cardiovascular: No chest pain. No palpitations.  Respiratory: No shortness of breath. No cough.  Gastrointestinal: No nausea or vomiting. No diarrhea or constipation.   Psychiatric: Pleasant. Appropriate affect.    Review of systems otherwise negative except as previously noted.    Physical Examination--  Vital Signs: T(F): 97.7 (01-06-19 @ 08:23), Max: 98.1 (01-05-19 @ 23:12)  HR: 63 (01-06-19 @ 08:23)  BP: 106/78 (01-06-19 @ 08:23)  RR: 18 (01-06-19 @ 08:23)  SpO2: 98% (01-06-19 @ 08:23)  Wt(kg): --  General: Nontoxic-appearing Male in no acute distress.  HEENT: AT/NC. PERRL. EOMI. Conjunctiva pink and moist. swelling left upper eyelid, pain and diplopia on left lateral gaze  Oropharynx clear. Dentition fair.  Neck: Not rigid. No sense of mass.  Nodes: None palpable.  Lungs: Clear bilaterally without rales, wheezing or rhonchi  Heart: Regular rate and rhythm. No Murmur. No rub. No gallop. No palpable thrill.  Abdomen: Bowel sounds present and normoactive. Soft. Nondistended. Nontender. No sense of mass. No organomegaly.  Back: No spinal tenderness. No costovertebral angle tenderness.   Extremities: No cyanosis or clubbing. No edema.   Skin: Warm. Dry. Good turgor. No rash. No vasculitic stigmata.  Psychiatric: Appropriate affect and mood for situation.         Laboratory Studies--  CBC                        13.5   8.97  )-----------( 206      ( 06 Jan 2019 07:17 )             40.0       Chemistries  01-06    140  |  105  |  20  ----------------------------<  103<H>  3.5   |  29  |  0.94    Ca    8.6      06 Jan 2019 07:17    TPro  7.8  /  Alb  4.3  /  TBili  0.4  /  DBili  x   /  AST  15  /  ALT  29  /  AlkPhos  75  01-04      RADIOLOGY:  CT Orbit w/wo IV Cont (01.03.19 @ 17:43) >    FINDINGS:  The globes are intact. The left lacrimal gland is enlarged. There is left   periorbital soft tissue swelling/edema. No extension to the left   retrobulbar fat.    Retention cyst/polyp right maxillary sinus noted    IMPRESSION:  Prominent left lacrimal gland with adjacent soft tissue swelling/edema.   Findings may represent dacroadenitis in the correct clinical setting.   Sarcoidosis, lymphoma and orbital pseudotumor could have similar   radiographic appearance. Clinical correlation and follow-up exam   recommended after appropriate treatment.      Assessment :     33 year old male admitted with recurrent cellulitis left eyelid, not responding to po antibiotics , CT scan of orbit suspicious for left dacrocystitis or  dacroadenitis . Likely pathogen would be strep ot Staph , especially MRSA . he has diplopia on left lateral gaze .     Plan:  - will increase Vancomycin to 1500 mg q 12 based on his weigh of 109 kg   - dc Zosyn as not needed   - continue with warm soaks to left eye q 4 hours   - needs opthalmology follow up   - may need biopsy of lacrimal gland to rule out other pathology   - dc plan as per ophthalmology     Continue with present regime .  Appropriate use of antibiotics and adverse effects reviewed.    I have discussed the above plan of care with patient in detail. he expressed understanding of the treatment plan . Risks, benefits and alternatives discussed in detail. I have asked if he has  any questions or concerns and appropriately addressed them to the best of my ability .      > 35 minutes spent in direct patient care reviewing  the notes, lab data/ imaging , discussion with multidisciplinary team. All questions were addressed and answered to the best of my capacity .    Thank you for allowing me to participate in the care of your patient .        Mckinley George MD  [phone]

## 2019-01-06 NOTE — DISCHARGE NOTE ADULT - NS AS ACTIVITY OBS
Do not drive or operate machinery/Walking-Outdoors allowed/Bathing allowed/Showering allowed/Walking-Indoors allowed/Stairs allowed

## 2019-01-06 NOTE — DISCHARGE NOTE ADULT - PROVIDER TOKENS
FREE:[LAST:[lloyd],PHONE:[(   )    -],FAX:[(   )    -]],FREE:[LAST:[dr pascual at [facility] ophthalmolgy clinic],PHONE:[(   )    -],FAX:[(   )    -]]

## 2019-01-06 NOTE — DISCHARGE NOTE ADULT - MEDICATION SUMMARY - MEDICATIONS TO TAKE
I will START or STAY ON the medications listed below when I get home from the hospital:    doxycycline hyclate 100 mg oral tablet  -- 1 tab(s) by mouth 2 times a day  -- Indication: For Adenitis, acute    doxycycline hyclate 100 mg oral tablet  -- 1 tab(s) by mouth 2 times a day  -- Indication: For Cellulitis of left orbital region

## 2019-01-06 NOTE — DISCHARGE NOTE ADULT - CARE PROVIDER_API CALL
lloyd,   Phone: (   )    -  Fax: (   )    -    dr pascual at [facility] ophthalmolgy clinic,   Phone: (   )    -  Fax: (   )    -

## 2019-01-07 ENCOUNTER — TRANSCRIPTION ENCOUNTER (OUTPATIENT)
Age: 34
End: 2019-01-07

## 2019-01-07 LAB
ACE SERPL-CCNC: 48 U/L — SIGNIFICANT CHANGE UP (ref 14–82)
AUTO DIFF PNL BLD: NEGATIVE — SIGNIFICANT CHANGE UP
C-ANCA SER-ACNC: NEGATIVE — SIGNIFICANT CHANGE UP
P-ANCA SER-ACNC: NEGATIVE — SIGNIFICANT CHANGE UP

## 2019-01-08 ENCOUNTER — RESULT REVIEW (OUTPATIENT)
Age: 34
End: 2019-01-08

## 2019-01-08 ENCOUNTER — OUTPATIENT (OUTPATIENT)
Dept: OUTPATIENT SERVICES | Facility: HOSPITAL | Age: 34
LOS: 1 days | Discharge: HOME | End: 2019-01-08
Payer: COMMERCIAL

## 2019-01-08 VITALS
HEIGHT: 78 IN | HEART RATE: 72 BPM | SYSTOLIC BLOOD PRESSURE: 133 MMHG | DIASTOLIC BLOOD PRESSURE: 84 MMHG | TEMPERATURE: 98 F | WEIGHT: 244.93 LBS | OXYGEN SATURATION: 100 % | RESPIRATION RATE: 18 BRPM

## 2019-01-08 VITALS
SYSTOLIC BLOOD PRESSURE: 143 MMHG | RESPIRATION RATE: 20 BRPM | OXYGEN SATURATION: 98 % | DIASTOLIC BLOOD PRESSURE: 84 MMHG | TEMPERATURE: 98 F | HEART RATE: 81 BPM

## 2019-01-08 DIAGNOSIS — I88.9 NONSPECIFIC LYMPHADENITIS, UNSPECIFIED: ICD-10-CM

## 2019-01-08 LAB
B BURGDOR C6 AB SER-ACNC: NEGATIVE — SIGNIFICANT CHANGE UP
B BURGDOR IGG+IGM SER-ACNC: 0.1 INDEX — SIGNIFICANT CHANGE UP (ref 0.01–0.89)
IGG SERPL-MCNC: 744 MG/DL — SIGNIFICANT CHANGE UP (ref 700–1600)
IGG1 SER-MCNC: SIGNIFICANT CHANGE UP
IGG2 SER-MCNC: SIGNIFICANT CHANGE UP
IGG3 SER-MCNC: SIGNIFICANT CHANGE UP
IGG4 SER-MCNC: SIGNIFICANT CHANGE UP

## 2019-01-08 PROCEDURE — 67400 EXPLORE/BIOPSY EYE SOCKET: CPT | Mod: LT

## 2019-01-08 PROCEDURE — 88341 IMHCHEM/IMCYTCHM EA ADD ANTB: CPT

## 2019-01-08 PROCEDURE — 88342 IMHCHEM/IMCYTCHM 1ST ANTB: CPT

## 2019-01-08 PROCEDURE — 88364 INSITU HYBRIDIZATION (FISH): CPT | Mod: 26

## 2019-01-08 PROCEDURE — 88331 PATH CONSLTJ SURG 1 BLK 1SPC: CPT | Mod: 26

## 2019-01-08 PROCEDURE — 88365 INSITU HYBRIDIZATION (FISH): CPT

## 2019-01-08 PROCEDURE — 88331 PATH CONSLTJ SURG 1 BLK 1SPC: CPT

## 2019-01-08 PROCEDURE — 88341 IMHCHEM/IMCYTCHM EA ADD ANTB: CPT | Mod: 26

## 2019-01-08 PROCEDURE — 88342 IMHCHEM/IMCYTCHM 1ST ANTB: CPT | Mod: 26

## 2019-01-08 PROCEDURE — 88305 TISSUE EXAM BY PATHOLOGIST: CPT

## 2019-01-08 PROCEDURE — 88365 INSITU HYBRIDIZATION (FISH): CPT | Mod: 26,59

## 2019-01-08 PROCEDURE — 88305 TISSUE EXAM BY PATHOLOGIST: CPT | Mod: 26

## 2019-01-08 RX ORDER — ONDANSETRON 8 MG/1
4 TABLET, FILM COATED ORAL ONCE
Qty: 0 | Refills: 0 | Status: DISCONTINUED | OUTPATIENT
Start: 2019-01-08 | End: 2019-01-23

## 2019-01-08 RX ORDER — LIDOCAINE HCL 20 MG/ML
0.2 VIAL (ML) INJECTION ONCE
Qty: 0 | Refills: 0 | Status: DISCONTINUED | OUTPATIENT
Start: 2019-01-08 | End: 2019-01-23

## 2019-01-08 RX ORDER — CELECOXIB 200 MG/1
200 CAPSULE ORAL ONCE
Qty: 0 | Refills: 0 | Status: COMPLETED | OUTPATIENT
Start: 2019-01-08 | End: 2019-01-08

## 2019-01-08 RX ORDER — SODIUM CHLORIDE 9 MG/ML
3 INJECTION INTRAMUSCULAR; INTRAVENOUS; SUBCUTANEOUS EVERY 8 HOURS
Qty: 0 | Refills: 0 | Status: DISCONTINUED | OUTPATIENT
Start: 2019-01-08 | End: 2019-01-23

## 2019-01-08 RX ORDER — ACETAMINOPHEN 500 MG
1000 TABLET ORAL ONCE
Qty: 0 | Refills: 0 | Status: COMPLETED | OUTPATIENT
Start: 2019-01-08 | End: 2019-01-08

## 2019-01-08 RX ORDER — SODIUM CHLORIDE 9 MG/ML
1000 INJECTION, SOLUTION INTRAVENOUS
Qty: 0 | Refills: 0 | Status: DISCONTINUED | OUTPATIENT
Start: 2019-01-08 | End: 2019-01-23

## 2019-01-08 RX ADMIN — CELECOXIB 200 MILLIGRAM(S): 200 CAPSULE ORAL at 16:11

## 2019-01-08 RX ADMIN — CELECOXIB 200 MILLIGRAM(S): 200 CAPSULE ORAL at 14:54

## 2019-01-08 RX ADMIN — Medication 1000 MILLIGRAM(S): at 14:54

## 2019-01-08 NOTE — ASU DISCHARGE PLAN (ADULT/PEDIATRIC). - MEDICATION SUMMARY - MEDICATIONS TO TAKE
I will START or STAY ON the medications listed below when I get home from the hospital:    doxycycline hyclate 100 mg oral tablet  -- 1 tab(s) by mouth 2 times a day  -- Indication: For Lymphadenitis    doxycycline hyclate 100 mg oral tablet  -- 1 tab(s) by mouth 2 times a day  -- Indication: For Lymphadenitis

## 2019-01-08 NOTE — H&P PST ADULT - HISTORY OF PRESENT ILLNESS
33 y.o. male c/o Left eye swelling for one month. A CT scan revealed orbital cellulitis. He is scheduled for Excision of Tumor in Left Eye.

## 2019-01-08 NOTE — H&P PST ADULT - NSANTHOSAYNRD_GEN_A_CORE
No. JUAN DIEGO screening performed.  STOP BANG Legend: 0-2 = LOW Risk; 3-4 = INTERMEDIATE Risk; 5-8 = HIGH Risk

## 2019-01-09 LAB — RPR SER-TITR: ABNORMAL

## 2019-01-10 LAB
CULTURE RESULTS: SIGNIFICANT CHANGE UP
CULTURE RESULTS: SIGNIFICANT CHANGE UP
SPECIMEN SOURCE: SIGNIFICANT CHANGE UP
SPECIMEN SOURCE: SIGNIFICANT CHANGE UP

## 2019-01-12 LAB — T PALLIDUM AB TITR SER: NEGATIVE — SIGNIFICANT CHANGE UP

## 2019-01-29 LAB — SURGICAL PATHOLOGY STUDY: SIGNIFICANT CHANGE UP

## 2019-02-16 NOTE — PRE-ANESTHESIA EVALUATION ADULT - NSANTHOBSERVEDRD_ENT_A_CORE
Yes Pt already (supra)therapeutic on Coumadin  No further DVT ppx needed  No need for Vitamin K given no signs/symptoms of bleeding

## 2019-07-18 NOTE — ED PROVIDER NOTE - TEMPLATE, MLM
HPI:  Blood pressure has improved and patient remains tachycardic 100 - 120 bpm.  BUN and Creatinine continue to rise (slowly).  Patient diuresed well overnight and this AM after repositioning her in bed.    Review Of Systems:  Unable to assess in the setting of advanced dementia.        Medications:  acetaminophen  Suppository .. 650 milliGRAM(s) Rectal every 6 hours PRN  apixaban 2.5 milliGRAM(s) Oral two times a day  artificial tears (preservative free) Ophthalmic Solution 1 Drop(s) Both EYES four times a day  ascorbic acid 500 milliGRAM(s) Oral daily  BACItracin   Ointment 1 Application(s) Topical two times a day  carboxymethylcellulose 0.5% (Preservative-Free) Ophthalmic Solution 1 Drop(s) Both EYES three times a day PRN  chlorhexidine 2% Cloths 1 Application(s) Topical daily  clonazePAM  Tablet 1 milliGRAM(s) Oral at bedtime  Dakins Solution - 1/4 Strength 1 Application(s) Topical two times a day  dextrose 5%. 1000 milliLiter(s) IV Continuous <Continuous>  famotidine    Tablet 20 milliGRAM(s) Oral daily  ferrous    sulfate Liquid 300 milliGRAM(s) Enteral Tube daily  formoterol for nebulization 20 MICROGram(s) Nebulizer two times a day  furosemide Infusion 10 mG/Hr IV Continuous <Continuous>  gabapentin   Solution 300 milliGRAM(s) Oral every 12 hours  glucagon  Injectable 1 milliGRAM(s) IntraMuscular once PRN  hydrocortisone sodium succinate Injectable 25 milliGRAM(s) IV Push every 12 hours  insulin lispro (HumaLOG) corrective regimen sliding scale   SubCutaneous every 6 hours  levothyroxine Injectable 60 MICROGram(s) IV Push at bedtime  Medical Marijuana Awake Oil 2 milliLiter(s),Medical Marijuana Awake Oil 2 milliLiter(s) 2 milliLiter(s) Enteral Tube <User Schedule>  Medical Marijuana Calm Oil 2 milliLiter(s),Medical Marinjuana Calm Oil 2 milliLiter(s) 2 milliLiter(s) Enteral Tube <User Schedule>  Medical Marijuana [place] Oil 2 milliLiter(s),Medical Marijuana [place] Oil 2 milliLiter(s) 2 milliLiter(s) Enteral Tube <User Schedule>  meropenem  IVPB 500 milliGRAM(s) IV Intermittent every 24 hours  multivitamin/minerals/iron Oral Solution (CENTRUM) 15 milliLiter(s) Oral daily  nystatin Powder 1 Application(s) Topical three times a day PRN  QUEtiapine 25 milliGRAM(s) Oral at bedtime  sodium chloride 0.9%. 1000 milliLiter(s) IV Continuous <Continuous>  tiZANidine 4 milliGRAM(s) Oral <User Schedule>  zinc sulfate 220 milliGRAM(s) Oral daily    PAST MEDICAL & SURGICAL HISTORY:  Type 2 diabetes mellitus  Dementia  DVT, lower extremity  CVA (cerebral vascular accident)  Fatty pancreas  PNA (pneumonia)  Pulmonary HTN  IGT (impaired glucose tolerance)  Ulcerative colitis  Acid reflux  Anxiety  Depression  Mouth sores  HLD (hyperlipidemia)  Asthma  S/P percutaneous endoscopic gastrostomy (PEG) tube placement: for dysphagia  Humeral head fracture  H/O: hysterectomy  H/O cataract extraction, left  History of knee replacement    Vitals:  T(C): 36.9 (07-18-19 @ 13:00), Max: 36.9 (07-17-19 @ 23:00)  HR: 112 (07-18-19 @ 13:00) (111 - 132)  BP: 123/77 (07-18-19 @ 13:00) (99/65 - 141/95)  BP(mean): --  RR: 18 (07-18-19 @ 13:00) (18 - 18)  SpO2: 97% (07-18-19 @ 13:00) (97% - 100%)  Wt(kg): --  Daily     Daily   I&O's Summary    17 Jul 2019 07:01  -  18 Jul 2019 07:00  --------------------------------------------------------  IN: 535 mL / OUT: 605 mL / NET: -70 mL    18 Jul 2019 07:01  -  18 Jul 2019 16:01  --------------------------------------------------------  IN: 200 mL / OUT: 325 mL / NET: -125 mL        Physical Exam:  Appearance: functional quadriplegia   Eyes: PERRLA, EOMI, pink conjunctiva, no scleral icterus   HENT: Normal oral mucosa  Cardiovascular: RRR, S1, S2, no murmur, rub, or gallop; + edema in hands and feet; +JVD  Procedural Access Site: Clean, dry, intact, without hematoma  Respiratory: bilateral air entry; poor inspiratory effort  Gastrointestinal: Soft, non-tender, non-distended, BS+, +PEG  Musculoskeletal: +contracted  Neurologic: functional quadriplegia   Lymphatic: No lymphadenopathy  Psychiatry: advanced dementia  Skin: No rashes, ecchymoses, or cyanosis on extremities                          8.5    23.2  )-----------( 311      ( 18 Jul 2019 12:32 )             24.9     07-18    134<L>  |  93<L>  |  115<H>  ----------------------------<  154<H>  3.9   |  19<L>  |  3.26<H>    Ca    8.9      18 Jul 2019 06:28      PTT - ( 18 Jul 2019 06:29 )  PTT:33.2 sec      Serum Pro-Brain Natriuretic Peptide: >98522 pg/mL (07-16 @ 12:00)  Serum Pro-Brain Natriuretic Peptide: 49254 pg/mL (07-13 @ 05:37) Eye

## 2019-09-23 ENCOUNTER — RESULT REVIEW (OUTPATIENT)
Age: 34
End: 2019-09-23

## 2019-12-10 ENCOUNTER — RESULT REVIEW (OUTPATIENT)
Age: 34
End: 2019-12-10

## 2021-02-27 ENCOUNTER — TRANSCRIPTION ENCOUNTER (OUTPATIENT)
Age: 36
End: 2021-02-27

## 2021-03-03 ENCOUNTER — TRANSCRIPTION ENCOUNTER (OUTPATIENT)
Age: 36
End: 2021-03-03

## 2021-03-24 PROBLEM — I88.9 NONSPECIFIC LYMPHADENITIS, UNSPECIFIED: Chronic | Status: ACTIVE | Noted: 2019-01-08

## 2021-03-24 PROBLEM — Z00.00 ENCOUNTER FOR PREVENTIVE HEALTH EXAMINATION: Status: ACTIVE | Noted: 2021-03-24

## 2021-04-08 NOTE — PHYSICAL EXAM
[de-identified] : General Exam\par \par Well developed, well nourished\par No apparent distress\par Oriented to person, place, and time\par Mood: Normal\par Affect: Normal\par Balance and coordination: Normal\par Gait: Normal\par

## 2021-04-08 NOTE — HISTORY OF PRESENT ILLNESS
[de-identified] : \par \par The patient's past medical history, past surgical history, medications, allergies, and social history were reviewed by me today with the patient and documented accordingly. In addition, the patient's family history, which is noncontributory to this visit, was also reviewed.\par

## 2021-04-09 ENCOUNTER — APPOINTMENT (OUTPATIENT)
Dept: ORTHOPEDIC SURGERY | Facility: CLINIC | Age: 36
End: 2021-04-09

## 2021-04-23 ENCOUNTER — APPOINTMENT (OUTPATIENT)
Dept: ORTHOPEDIC SURGERY | Facility: CLINIC | Age: 36
End: 2021-04-23

## 2021-06-14 ENCOUNTER — EMERGENCY (EMERGENCY)
Facility: HOSPITAL | Age: 36
LOS: 1 days | Discharge: HOME | End: 2021-06-14
Attending: EMERGENCY MEDICINE | Admitting: EMERGENCY MEDICINE
Payer: COMMERCIAL

## 2021-06-14 VITALS
DIASTOLIC BLOOD PRESSURE: 80 MMHG | SYSTOLIC BLOOD PRESSURE: 138 MMHG | HEART RATE: 70 BPM | OXYGEN SATURATION: 98 % | TEMPERATURE: 98 F | RESPIRATION RATE: 16 BRPM

## 2021-06-14 VITALS
HEIGHT: 72 IN | RESPIRATION RATE: 15 BRPM | WEIGHT: 240.08 LBS | HEART RATE: 74 BPM | TEMPERATURE: 98 F | OXYGEN SATURATION: 98 % | SYSTOLIC BLOOD PRESSURE: 153 MMHG | DIASTOLIC BLOOD PRESSURE: 87 MMHG

## 2021-06-14 PROCEDURE — 73630 X-RAY EXAM OF FOOT: CPT

## 2021-06-14 PROCEDURE — 90471 IMMUNIZATION ADMIN: CPT

## 2021-06-14 PROCEDURE — 99284 EMERGENCY DEPT VISIT MOD MDM: CPT

## 2021-06-14 PROCEDURE — 90715 TDAP VACCINE 7 YRS/> IM: CPT

## 2021-06-14 PROCEDURE — 73630 X-RAY EXAM OF FOOT: CPT | Mod: 26,RT

## 2021-06-14 PROCEDURE — 99283 EMERGENCY DEPT VISIT LOW MDM: CPT | Mod: 25

## 2021-06-14 RX ORDER — IBUPROFEN 200 MG
1 TABLET ORAL
Qty: 20 | Refills: 0
Start: 2021-06-14 | End: 2021-06-18

## 2021-06-14 RX ORDER — TETANUS TOXOID, REDUCED DIPHTHERIA TOXOID AND ACELLULAR PERTUSSIS VACCINE, ADSORBED 5; 2.5; 8; 8; 2.5 [IU]/.5ML; [IU]/.5ML; UG/.5ML; UG/.5ML; UG/.5ML
0.5 SUSPENSION INTRAMUSCULAR ONCE
Refills: 0 | Status: COMPLETED | OUTPATIENT
Start: 2021-06-14 | End: 2021-06-14

## 2021-06-14 RX ORDER — IBUPROFEN 200 MG
600 TABLET ORAL ONCE
Refills: 0 | Status: COMPLETED | OUTPATIENT
Start: 2021-06-14 | End: 2021-06-14

## 2021-06-14 RX ADMIN — Medication 600 MILLIGRAM(S): at 08:34

## 2021-06-14 RX ADMIN — Medication 1 TABLET(S): at 08:34

## 2021-06-14 RX ADMIN — TETANUS TOXOID, REDUCED DIPHTHERIA TOXOID AND ACELLULAR PERTUSSIS VACCINE, ADSORBED 0.5 MILLILITER(S): 5; 2.5; 8; 8; 2.5 SUSPENSION INTRAMUSCULAR at 08:33

## 2021-06-14 NOTE — ED ADULT NURSE NOTE - NSIMPLEMENTINTERV_GEN_ALL_ED
Implemented All Universal Safety Interventions:  [place] to call system. Call bell, personal items and telephone within reach. Instruct patient to call for assistance. Room bathroom lighting operational. Non-slip footwear when patient is off stretcher. Physically safe environment: no spills, clutter or unnecessary equipment. Stretcher in lowest position, wheels locked, appropriate side rails in place.

## 2021-06-14 NOTE — ED PROVIDER NOTE - CARE PROVIDER_API CALL
Martin Benson (DPNORMA)  [place] [address]  Phone: [phone]  Fax: [phone]  Follow Up Time:

## 2021-06-14 NOTE — ED PROVIDER NOTE - CLINICAL SUMMARY MEDICAL DECISION MAKING FREE TEXT BOX
Pt is a 37 yo male who presents to the ED with a cc of puncture wound to right foot. 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 He reports that this consists of walking up treated wood with shoes that have spikes. He reports that at one point his left foot slipped and the spike went through his right posterior boot causing a puncture wound. Initially he did not think much of it. He cleaned out the site but he was able to walk and go about his usual activities. Yesterday pt further noted no additional symptoms but today he awoke with some discomfort at site of puncture wound. He spoke with his boss and was told to come to the ED for further work up. Denies fever, chills, N/V, CP, SOB. Pt has remained ambulatory. Unsure of date of last Tetanus. Pt with puncture wound noted to right foot concern for developing cellulitis will obtain x-ray update Tetanus and begin abx

## 2021-06-14 NOTE — ED PROVIDER NOTE - PHYSICAL EXAMINATION
Right foot:  1 cm puncture wound noted just medial to Achilles appears to be healing, with mild surrounding redness, no discharge or drainage noted  FROM of foot with 5/5 strength  Achilles reflex intact  Sensation grossly intact +pedal pulse cap refill less then 2 seconds

## 2021-06-14 NOTE — ED ADULT NURSE NOTE - OBJECTIVE STATEMENT
Pt received sitting on stretcher in NAD. Pt AOx3 C/o having hook stuck up his right foot on saturday. opt states at rest pain is 3 and when ambulating pain is 7. . Neuro WNL. PERRLA. Lungs CTA, RR even unlabored. Ab soft non tender, + bowel sounds x 4quads. Denies Nausea, Vomiting, Diarrhea. Skin warm, dry, color appropriate for age and race. Pt MAEX4 + right foot movement, + pulses. slight redness noted to heel.

## 2021-06-14 NOTE — ED PROVIDER NOTE - NSFOLLOWUPINSTRUCTIONS_ED_ALL_ED_FT
Return to the ED for any new or worsening symptoms  Take your medication as prescribed  Augmentin 1 tab 2 times a day  Motrin per label instructions as needed for pain   Bacitracin to affected wound 2 times a day   Advance activity as tolerated   Follow up with your PMD in 2-3 days for a recheck Return to the ED for any new or worsening symptoms  Take your medication as prescribed  Augmentin 1 tab 2 times a day  Motrin per label instructions as needed for pain   Bacitracin to affected wound 2 times a day   Advance activity as tolerated   Follow up with your PMD or podiatry in 2-3 days for a recheck

## 2021-06-14 NOTE — ED PROVIDER NOTE - OBJECTIVE STATEMENT
Pt is a 37 yo male who presents to the ED with a cc of puncture wound to right foot. 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 He reports that this consists of walking up treated wood with shoes that have spikes. He reports that at one point his left foot slipped and the spike went through his right posterior boot causing a puncture wound. Initially he did not think much of it. He cleaned out the site but he was able to walk and go about his usual activities. Yesterday pt further noted no additional symptoms but today he awoke with some discomfort at site of puncture wound. He spoke with his boss and was told to come to the ED for further work up. Denies fever, chills, N/V, CP, SOB. Pt has remained ambulatory. Unsure of date of last Tetanus.

## 2021-06-14 NOTE — ED PROVIDER NOTE - PROGRESS NOTE DETAILS
no fracture or gas noted in tissue, stable for discharge home. Abx provided. Advised if symptoms worsened to follow up with podiatry

## 2021-06-14 NOTE — ED PROVIDER NOTE - PATIENT PORTAL LINK FT
You can access the FollowMyHealth Patient Portal offered by [facility] by registering at the following website: [email or web address]. By joining [employer]’s FollowMyHealth portal, you will also be able to view your health information using other applications (apps) compatible with our system.

## 2021-06-22 ENCOUNTER — APPOINTMENT (OUTPATIENT)
Dept: WOUND CARE | Facility: HOSPITAL | Age: 36
End: 2021-06-22
Payer: COMMERCIAL

## 2021-06-22 ENCOUNTER — OUTPATIENT (OUTPATIENT)
Dept: OUTPATIENT SERVICES | Facility: HOSPITAL | Age: 36
LOS: 1 days | Discharge: HOME | End: 2021-06-22
Payer: COMMERCIAL

## 2021-06-22 VITALS
WEIGHT: 240 LBS | DIASTOLIC BLOOD PRESSURE: 85 MMHG | OXYGEN SATURATION: 98 % | HEIGHT: 72 IN | RESPIRATION RATE: 20 BRPM | HEART RATE: 64 BPM | SYSTOLIC BLOOD PRESSURE: 130 MMHG | BODY MASS INDEX: 32.51 KG/M2 | TEMPERATURE: 97.6 F

## 2021-06-22 DIAGNOSIS — Z78.9 OTHER SPECIFIED HEALTH STATUS: ICD-10-CM

## 2021-06-22 DIAGNOSIS — Z83.3 FAMILY HISTORY OF DIABETES MELLITUS: ICD-10-CM

## 2021-06-22 DIAGNOSIS — Z80.0 FAMILY HISTORY OF MALIGNANT NEOPLASM OF DIGESTIVE ORGANS: ICD-10-CM

## 2021-06-22 DIAGNOSIS — S91.331A PUNCTURE WOUND WITHOUT FOREIGN BODY, RIGHT FOOT, INITIAL ENCOUNTER: ICD-10-CM

## 2021-06-22 PROCEDURE — G0463: CPT

## 2021-06-22 PROCEDURE — 99203 OFFICE O/P NEW LOW 30 MIN: CPT

## 2021-06-22 RX ORDER — MULTIVITAMIN
TABLET ORAL
Refills: 0 | Status: ACTIVE | COMMUNITY

## 2021-06-22 NOTE — PLAN
[FreeTextEntry1] : Patient examined and evaluated at this time.\par Continue local wound care and offloading.\par Will auth for MRI of right heel. Advised regarding possibility of a seeded infection in bone. All questions answered to satisfaction and patient verbalized understanding.\par Spent 30 minutes for patient care and medical decision making.\par Patient to follow up in 2 weeks.\par

## 2021-06-22 NOTE — ASSESSMENT
[Verbal] : Verbal [Written] : Written [Patient] : Patient [Good - alert, interested, motivated] : Good - alert, interested, motivated [Verbalizes knowledge/Understanding] : Verbalizes knowledge/understanding [Foot Care] : foot care [Skin Care] : skin care [Signs and symptoms of infection] : sign and symptoms of infection [How and When to Call] : how and when to call [Labs and Tests] : labs and tests [Pain Management] : pain management [Off-loading] : off-loading [Patient responsibility to plan of care] : patient responsibility to plan of care [Stable] : stable [Home] : Home [Ambulatory] : Ambulatory [Not Applicable - Long Term Care/Home Health Agency] : Long Term Care/Home Health Agency: Not Applicable [] : No [FreeTextEntry2] : Infection prevention\par Goal of remaining pain free regarding wounds.\par  [FreeTextEntry4] : aUTH SUBMITTED FOR mri\par Follow up in 2 weeks \par \par CAMERA ERROR. PLEASE OBTAIN PHOTO AT NEXT VISIT

## 2021-06-22 NOTE — REVIEW OF SYSTEMS
[Fever] : no fever [Eye Pain] : no eye pain [Earache] : no earache [Chest Pain] : no chest pain [Shortness Of Breath] : no shortness of breath [Cough] : no cough [Abdominal Pain] : no abdominal pain [Skin Wound] : skin wound [de-identified] : right heel puncture wound, mild periwound erythema, no purulence, no drainage, no proximal streaking

## 2021-06-22 NOTE — HISTORY OF PRESENT ILLNESS
[FreeTextEntry1] : Patient seen for right heel puncture wound. On 6/12/21 patient was on a ladder in his backyard when he took a misstep and punctured his right medial heel. At first pain wasn't bad but the next day stated was extremely painful. On 6/13/21 he went to ER where x - ray was taken which was negative, and was prescribed Augmentin. Tetanus shot received. Yesterday was his last dose. Since them patient states the pain has greatly subsided and has been applying bacitracin at home. Told to follow up with [facility].

## 2021-06-22 NOTE — PHYSICAL EXAM
[2+] : left 2+ [Ankle Swelling (On Exam)] : not present [Varicose Veins Of Lower Extremities] : not present [] : not present [Skin Ulcer] : ulcer [Calm] : calm [de-identified] : A&Ox3, NAD [de-identified] : 5/5 strength in all quadrants bilaterally [de-identified] : right heel puncture wound, mild periwound erythema, no purulence, no drainage, no proximal streaking [de-identified] : Light touch sensation intact bilaterally [FreeTextEntry1] : Right medial heel - No open wound. Scab at puncture site with small amount of swelling.   [de-identified] : Cleansed with NS\par No other treatment.\par \par  Patient to use Bacitracin and cover with gauze before going to work.  [de-identified] : Dorsalis Pedis: +1 Bilateral \par Posterior Tibialis: +1 Bilateral\par Doppler pulses:  N/A\par Extremity color: Normal \par Extremity temperature: Warm \par Capillary refill: < 3 sec\par \par  [TWNoteComboBox4] : None [de-identified] : Indurated [de-identified] : None [de-identified] : None [de-identified] : No

## 2021-06-23 DIAGNOSIS — Z80.0 FAMILY HISTORY OF MALIGNANT NEOPLASM OF DIGESTIVE ORGANS: ICD-10-CM

## 2021-06-23 DIAGNOSIS — Z83.3 FAMILY HISTORY OF DIABETES MELLITUS: ICD-10-CM
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
## 2021-06-23 DIAGNOSIS — S91.331D PUNCTURE WOUND WITHOUT FOREIGN BODY, RIGHT FOOT, SUBSEQUENT ENCOUNTER: ICD-10-CM
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
## 2021-06-23 DIAGNOSIS — Z79.899 OTHER LONG TERM (CURRENT) DRUG THERAPY: ICD-10-CM
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
## 2021-06-23 DIAGNOSIS — Z98.890 OTHER SPECIFIED POSTPROCEDURAL STATES: ICD-10-CM

## 2021-06-29 ENCOUNTER — OUTPATIENT (OUTPATIENT)
Dept: OUTPATIENT SERVICES | Facility: HOSPITAL | Age: 36
LOS: 1 days | End: 2021-06-29
Payer: COMMERCIAL

## 2021-06-29 DIAGNOSIS — S91.331D PUNCTURE WOUND WITHOUT FOREIGN BODY, RIGHT FOOT, SUBSEQUENT ENCOUNTER: ICD-10-CM

## 2021-06-29 PROCEDURE — 73718 MRI LOWER EXTREMITY W/O DYE: CPT

## 2021-06-29 PROCEDURE — 73718 MRI LOWER EXTREMITY W/O DYE: CPT | Mod: 26,RT

## 2021-07-09 ENCOUNTER — APPOINTMENT (OUTPATIENT)
Dept: WOUND CARE | Facility: HOSPITAL | Age: 36
End: 2021-07-09
Payer: COMMERCIAL

## 2021-07-09 ENCOUNTER — OUTPATIENT (OUTPATIENT)
Dept: OUTPATIENT SERVICES | Facility: HOSPITAL | Age: 36
LOS: 1 days | Discharge: HOME | End: 2021-07-09
Payer: COMMERCIAL

## 2021-07-09 VITALS
SYSTOLIC BLOOD PRESSURE: 123 MMHG | DIASTOLIC BLOOD PRESSURE: 79 MMHG | HEART RATE: 66 BPM | OXYGEN SATURATION: 99 % | HEIGHT: 72 IN | RESPIRATION RATE: 20 BRPM | BODY MASS INDEX: 32.51 KG/M2 | WEIGHT: 240 LBS | TEMPERATURE: 97.3 F

## 2021-07-09 DIAGNOSIS — S91.331A PUNCTURE WOUND WITHOUT FOREIGN BODY, RIGHT FOOT, INITIAL ENCOUNTER: ICD-10-CM

## 2021-07-09 PROCEDURE — 99213 OFFICE O/P EST LOW 20 MIN: CPT

## 2021-07-09 PROCEDURE — G0463: CPT
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
## 2021-07-10 DIAGNOSIS — Z98.890 OTHER SPECIFIED POSTPROCEDURAL STATES: ICD-10-CM
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
## 2021-07-10 DIAGNOSIS — S91.331D PUNCTURE WOUND WITHOUT FOREIGN BODY, RIGHT FOOT, SUBSEQUENT ENCOUNTER: ICD-10-CM

## 2021-07-10 DIAGNOSIS — Z83.3 FAMILY HISTORY OF DIABETES MELLITUS: ICD-10-CM

## 2021-07-10 DIAGNOSIS — Z80.0 FAMILY HISTORY OF MALIGNANT NEOPLASM OF DIGESTIVE ORGANS: ICD-10-CM

## 2021-07-10 DIAGNOSIS — Z79.899 OTHER LONG TERM (CURRENT) DRUG THERAPY: ICD-10-CM

## 2021-08-04 ENCOUNTER — TRANSCRIPTION ENCOUNTER (OUTPATIENT)
Age: 36
End: 2021-08-04

## 2021-08-10 NOTE — PLAN
[FreeTextEntry1] : Patient examined and evaluated at this time.\par Continue local wound care and offloading.Discussed mri with patient regarding possibility of a seeded infection in bone. Pt advised to monitor for any changes. All questions answered to satisfaction and patient verbalized understanding.\par Spent 20 minutes for patient care and medical decision making.\par Patient to follow up in 2 months.\par

## 2021-08-10 NOTE — PHYSICAL EXAM
[2+] : left 2+ [Skin Ulcer] : ulcer [Calm] : calm [Ankle Swelling (On Exam)] : not present [Varicose Veins Of Lower Extremities] : not present [] : not present [de-identified] : A&Ox3, NAD [de-identified] : 5/5 strength in all quadrants bilaterally [de-identified] : right heel puncture wound, no erythema, no purulence, no drainage, no proximal streaking [de-identified] : Light touch sensation intact bilaterally [FreeTextEntry1] : Right medial heel - No open wounds scab at puncture site  [de-identified] : No other treatment  [TWNoteComboBox4] : None [de-identified] : Normal

## 2021-08-10 NOTE — ASSESSMENT
[Verbal] : Verbal [Written] : Written [Patient] : Patient [Good - alert, interested, motivated] : Good - alert, interested, motivated [Verbalizes knowledge/Understanding] : Verbalizes knowledge/understanding [Skin Care] : skin care [Signs and symptoms of infection] : sign and symptoms of infection [How and When to Call] : how and when to call [Off-loading] : off-loading [Stable] : stable [Home] : Home [Ambulatory] : Ambulatory [Not Applicable - Long Term Care/Home Health Agency] : Long Term Care/Home Health Agency: Not Applicable [] : No [FreeTextEntry2] : Infection prevention\par Localized wound care \par Offloading  [FreeTextEntry4] : MRI reviewed by DPNORMA with patient \par Follow up in 2 to 3 weeks

## 2021-08-10 NOTE — REVIEW OF SYSTEMS
[Skin Wound] : skin wound [Fever] : no fever [Eye Pain] : no eye pain [Earache] : no earache [Chest Pain] : no chest pain [Shortness Of Breath] : no shortness of breath [Cough] : no cough [Abdominal Pain] : no abdominal pain [de-identified] : right heel puncture wound, no erythema, no purulence, no drainage, no proximal streaking

## 2021-08-20 NOTE — ED ADULT NURSE NOTE - NSALCOHOLTYPE_GEN__A_CORE_SD
Price (Do Not Change): 0.00 Instructions: This plan will send the code FBSD to the PM system.  DO NOT or CHANGE the price. Detail Level: Simple beer

## 2021-09-23 ENCOUNTER — OUTPATIENT (OUTPATIENT)
Dept: OUTPATIENT SERVICES | Facility: HOSPITAL | Age: 36
LOS: 1 days | Discharge: HOME | End: 2021-09-23
Payer: COMMERCIAL

## 2021-09-23 ENCOUNTER — RESULT REVIEW (OUTPATIENT)
Age: 36
End: 2021-09-23

## 2021-09-23 ENCOUNTER — APPOINTMENT (OUTPATIENT)
Dept: WOUND CARE | Facility: HOSPITAL | Age: 36
End: 2021-09-23
Payer: COMMERCIAL

## 2021-09-23 VITALS
RESPIRATION RATE: 18 BRPM | WEIGHT: 240 LBS | HEIGHT: 72 IN | SYSTOLIC BLOOD PRESSURE: 135 MMHG | HEART RATE: 59 BPM | DIASTOLIC BLOOD PRESSURE: 87 MMHG | TEMPERATURE: 97.1 F | OXYGEN SATURATION: 97 % | BODY MASS INDEX: 32.51 KG/M2

## 2021-09-23 DIAGNOSIS — S91.309A UNSPECIFIED OPEN WOUND, UNSPECIFIED FOOT, INITIAL ENCOUNTER: ICD-10-CM

## 2021-09-23 PROCEDURE — 73630 X-RAY EXAM OF FOOT: CPT

## 2021-09-23 PROCEDURE — G0463: CPT

## 2021-09-23 PROCEDURE — 99213 OFFICE O/P EST LOW 20 MIN: CPT

## 2021-09-23 PROCEDURE — 73630 X-RAY EXAM OF FOOT: CPT | Mod: 26,50

## 2021-09-28 DIAGNOSIS — Z98.890 OTHER SPECIFIED POSTPROCEDURAL STATES: ICD-10-CM

## 2021-09-28 DIAGNOSIS — Z80.0 FAMILY HISTORY OF MALIGNANT NEOPLASM OF DIGESTIVE ORGANS: ICD-10-CM

## 2021-09-28 DIAGNOSIS — Z79.899 OTHER LONG TERM (CURRENT) DRUG THERAPY: ICD-10-CM
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
## 2021-09-28 DIAGNOSIS — S91.331D PUNCTURE WOUND WITHOUT FOREIGN BODY, RIGHT FOOT, SUBSEQUENT ENCOUNTER: ICD-10-CM

## 2021-09-28 DIAGNOSIS — Z83.3 FAMILY HISTORY OF DIABETES MELLITUS: ICD-10-CM

## 2021-09-28 NOTE — HISTORY OF PRESENT ILLNESS
[FreeTextEntry1] : s/p puncture wound right foot , r/o OM .metatarsalgia or plantar plate tear , no open wound

## 2021-09-28 NOTE — VITALS
[Pain related to present condition?] : The patient's  pain is related to present condition. [] : No [de-identified] : Pt reports sharp pain Plantar Foot 2nd Met area since about 1 week after Right Heel injury when pt walks- pt states pain is worse in the morning & gets better as day goes on- intensity of pain varies [FreeTextEntry3] : Right Plantar Foot 2nd Met [FreeTextEntry1] : pain lessens with activity throughout day [FreeTextEntry2] : walking/ pressure [FreeTextEntry4] : activity throughout day

## 2021-09-28 NOTE — PHYSICAL EXAM
[2+] : left 2+ [Ankle Swelling (On Exam)] : not present [Varicose Veins Of Lower Extremities] : not present [] : not present [Skin Ulcer] : ulcer [Calm] : calm [de-identified] : A&Ox3, NAD [de-identified] : right heel puncture wound, closed , patient has pain sub 2nd metatarsal right foot  [de-identified] : 5/5 strength in all quadrants bilaterally [de-identified] : Light touch sensation intact bilaterally [FreeTextEntry1] : Right Medial Heel- No open wound (s/p puncture 06/12/21) [de-identified] : No Dressing [FreeTextEntry7] : Right Plantar Foot 2nd Met- No open wound [de-identified] : No Dressing

## 2021-09-28 NOTE — REVIEW OF SYSTEMS
[Fever] : no fever [Eye Pain] : no eye pain [Earache] : no earache [Chest Pain] : no chest pain [Shortness Of Breath] : no shortness of breath [Cough] : no cough [Abdominal Pain] : no abdominal pain [Skin Wound] : skin wound [de-identified] : right heel puncture wound, closed , patient has pain sub 2nd metatarsal right foot

## 2021-09-28 NOTE — PLAN
[FreeTextEntry1] : Patient to have MRI and x rays , r/o OM , plantar plate tear or metatarsalgia  Spent 20 minutes for patient care and medical decision making.\par

## 2021-09-28 NOTE — ASSESSMENT
[Verbal] : Verbal [Patient] : Patient [Good - alert, interested, motivated] : Good - alert, interested, motivated [Foot Care] : foot care [Verbalizes knowledge/Understanding] : Verbalizes knowledge/understanding [Skin Care] : skin care [Pressure relief] : pressure relief [Signs and symptoms of infection] : sign and symptoms of infection [Labs and Tests] : labs and tests [How and When to Call] : how and when to call [Pain Management] : pain management [Off-loading] : off-loading [Patient responsibility to plan of care] : patient responsibility to plan of care [Stable] : stable [Home] : Home [Ambulatory] : Ambulatory [] : No [FreeTextEntry2] : Alteration in comfort related to pain- promote optimal comfort level/ pain free status\par  [FreeTextEntry3] : Pt reports discomfort Right Plantar Foot 2nd Met [FreeTextEntry4] : Dr Castillo/ Photo taken\par Foot xrays & MRI ordered by Dr Castillo\par Pt to [facility] today for Foot xrays\par Preauth sheet submitted for MRI\par F/U to [facility] in 1 week

## 2021-10-05 ENCOUNTER — APPOINTMENT (OUTPATIENT)
Dept: ORTHOPEDIC SURGERY | Facility: CLINIC | Age: 36
End: 2021-10-05
Payer: COMMERCIAL

## 2021-10-05 ENCOUNTER — RESULT REVIEW (OUTPATIENT)
Age: 36
End: 2021-10-05

## 2021-10-05 ENCOUNTER — NON-APPOINTMENT (OUTPATIENT)
Age: 36
End: 2021-10-05

## 2021-10-05 ENCOUNTER — OUTPATIENT (OUTPATIENT)
Dept: OUTPATIENT SERVICES | Facility: HOSPITAL | Age: 36
LOS: 1 days | End: 2021-10-05
Payer: COMMERCIAL

## 2021-10-05 VITALS — HEIGHT: 72 IN | WEIGHT: 245 LBS | BODY MASS INDEX: 33.18 KG/M2

## 2021-10-05 DIAGNOSIS — S91.331D PUNCTURE WOUND WITHOUT FOREIGN BODY, RIGHT FOOT, SUBSEQUENT ENCOUNTER: ICD-10-CM

## 2021-10-05 PROCEDURE — 99204 OFFICE O/P NEW MOD 45 MIN: CPT | Mod: 25

## 2021-10-05 PROCEDURE — 73720 MRI LWR EXTREMITY W/O&W/DYE: CPT

## 2021-10-05 PROCEDURE — 76942 ECHO GUIDE FOR BIOPSY: CPT

## 2021-10-05 PROCEDURE — 73720 MRI LWR EXTREMITY W/O&W/DYE: CPT | Mod: 26,RT

## 2021-10-05 PROCEDURE — A9579: CPT

## 2021-10-05 PROCEDURE — 73030 X-RAY EXAM OF SHOULDER: CPT | Mod: RT

## 2021-10-05 PROCEDURE — 20550 NJX 1 TENDON SHEATH/LIGAMENT: CPT | Mod: RT

## 2021-10-08 ENCOUNTER — APPOINTMENT (OUTPATIENT)
Dept: WOUND CARE | Facility: HOSPITAL | Age: 36
End: 2021-10-08
Payer: COMMERCIAL

## 2021-10-08 ENCOUNTER — OUTPATIENT (OUTPATIENT)
Dept: OUTPATIENT SERVICES | Facility: HOSPITAL | Age: 36
LOS: 1 days | Discharge: HOME | End: 2021-10-08
Payer: COMMERCIAL

## 2021-10-08 VITALS
OXYGEN SATURATION: 98 % | BODY MASS INDEX: 33.18 KG/M2 | HEART RATE: 64 BPM | WEIGHT: 245 LBS | DIASTOLIC BLOOD PRESSURE: 79 MMHG | RESPIRATION RATE: 18 BRPM | TEMPERATURE: 98.2 F | HEIGHT: 72 IN | SYSTOLIC BLOOD PRESSURE: 132 MMHG

## 2021-10-08 DIAGNOSIS — S91.331D: ICD-10-CM

## 2021-10-08 DIAGNOSIS — S91.331D PUNCTURE WOUND WITHOUT FOREIGN BODY, RIGHT FOOT, SUBSEQUENT ENCOUNTER: ICD-10-CM

## 2021-10-08 PROCEDURE — G0463: CPT

## 2021-10-08 PROCEDURE — 99213 OFFICE O/P EST LOW 20 MIN: CPT

## 2021-10-08 NOTE — PHYSICAL EXAM
[2+] : left 2+ [Ankle Swelling (On Exam)] : not present [Varicose Veins Of Lower Extremities] : not present [] : not present [Skin Ulcer] : ulcer [Calm] : calm [de-identified] : A&Ox3, NAD [de-identified] : 5/5 strength in all quadrants bilaterally [de-identified] : right heel puncture wound, closed , patient has pain sub 2nd metatarsal right foot  [de-identified] : Light touch sensation intact bilaterally [FreeTextEntry1] : Right Plantar Foot 2nd Met - No open wound [de-identified] : No product. [TWNoteComboBox4] : None [de-identified] : None

## 2021-10-08 NOTE — PLAN
[FreeTextEntry1] : Discussed radiographs with patient, will obtain MRI read, plantar plate tear or metatarsalgia. Discussed possible conservative vs surgical treatments at this time. Dispensed metatarsal pads at this time.  Spent 20 minutes for patient care and medical decision making.\par

## 2021-10-08 NOTE — REVIEW OF SYSTEMS
[Fever] : no fever [Eye Pain] : no eye pain [Earache] : no earache [Chest Pain] : no chest pain [Shortness Of Breath] : no shortness of breath [Cough] : no cough [Abdominal Pain] : no abdominal pain [Skin Wound] : skin wound [de-identified] : right heel puncture wound, closed , patient has pain sub 2nd metatarsal right foot

## 2021-10-08 NOTE — HISTORY OF PRESENT ILLNESS
[FreeTextEntry1] : s/p puncture wound right foot , r/o OM .metatarsalgia or plantar plate tear , no open wound. pt relates that the pain is presents moreso in the morning and notes that it worsens after being on his feet throughout the day.

## 2021-10-08 NOTE — VITALS
[Sharp] : sharp [Tender] : tender [] : No [de-identified] : 3/10. Pt reports pain greater in the morning (4/10). [FreeTextEntry3] : Right Plantar Foot 2nd Met [FreeTextEntry1] : Elevation [FreeTextEntry2] : Ambulation [FreeTextEntry4] : Pt's legs and foot elevated during assessment and treatment.

## 2021-10-08 NOTE — ASSESSMENT
[Verbal] : Verbal [Written] : Written [Demo] : Demo [Patient] : Patient [Good - alert, interested, motivated] : Good - alert, interested, motivated [Verbalizes knowledge/Understanding] : Verbalizes knowledge/understanding [Stable] : stable [Home] : Home [Ambulatory] : Ambulatory [Not Applicable - Long Term Care/Home Health Agency] : Long Term Care/Home Health Agency: Not Applicable [Foot Care] : foot care [Skin Care] : skin care [Signs and symptoms of infection] : sign and symptoms of infection [Surgery] : surgery [How and When to Call] : how and when to call [Labs and Tests] : labs and tests [Off-loading] : off-loading [Patient responsibility to plan of care] : patient responsibility to plan of care [] : No [FreeTextEntry2] : Infection prevention \par Maintain optimal skin integrity to high pressure areas\par Offloading\par  [FreeTextEntry3] : Unchanged.  [FreeTextEntry4] : Pt received X-RAY on 9/23/21. Reviewed by DPM\par Pt received MRI on 10/5/21. Results unavailable. Dr. Castillo to review with Pt once definitive read from Radiologist is available. \par Pt to use metatarsal pads to alleviate pressure.\par F/u to be decided once MRI results are available to clinician.
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
## 2021-10-11 DIAGNOSIS — Z98.890 OTHER SPECIFIED POSTPROCEDURAL STATES: ICD-10-CM

## 2021-10-11 DIAGNOSIS — Z83.3 FAMILY HISTORY OF DIABETES MELLITUS: ICD-10-CM
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
## 2021-10-11 DIAGNOSIS — Z80.0 FAMILY HISTORY OF MALIGNANT NEOPLASM OF DIGESTIVE ORGANS: ICD-10-CM
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
## 2021-10-11 DIAGNOSIS — Z79.899 OTHER LONG TERM (CURRENT) DRUG THERAPY: ICD-10-CM

## 2021-10-11 DIAGNOSIS — S91.331D PUNCTURE WOUND WITHOUT FOREIGN BODY, RIGHT FOOT, SUBSEQUENT ENCOUNTER: ICD-10-CM

## 2021-11-02 ENCOUNTER — TRANSCRIPTION ENCOUNTER (OUTPATIENT)
Age: 36
End: 2021-11-02

## 2022-02-01 ENCOUNTER — TRANSCRIPTION ENCOUNTER (OUTPATIENT)
Age: 37
End: 2022-02-01

## 2022-03-01 ENCOUNTER — LABORATORY RESULT (OUTPATIENT)
Age: 37
End: 2022-03-01

## 2022-03-05 ENCOUNTER — TRANSCRIPTION ENCOUNTER (OUTPATIENT)
Age: 37
End: 2022-03-05

## 2022-03-07 ENCOUNTER — EMERGENCY (EMERGENCY)
Facility: HOSPITAL | Age: 37
LOS: 1 days | Discharge: HOME | End: 2022-03-07
Attending: EMERGENCY MEDICINE | Admitting: EMERGENCY MEDICINE
Payer: COMMERCIAL

## 2022-03-07 VITALS
HEART RATE: 93 BPM | TEMPERATURE: 99 F | HEIGHT: 72 IN | DIASTOLIC BLOOD PRESSURE: 90 MMHG | WEIGHT: 244.93 LBS | RESPIRATION RATE: 18 BRPM | OXYGEN SATURATION: 97 % | SYSTOLIC BLOOD PRESSURE: 158 MMHG

## 2022-03-07 VITALS
RESPIRATION RATE: 16 BRPM | OXYGEN SATURATION: 98 % | DIASTOLIC BLOOD PRESSURE: 60 MMHG | HEART RATE: 88 BPM | SYSTOLIC BLOOD PRESSURE: 130 MMHG

## 2022-03-07 PROCEDURE — 73590 X-RAY EXAM OF LOWER LEG: CPT

## 2022-03-07 PROCEDURE — 99284 EMERGENCY DEPT VISIT MOD MDM: CPT

## 2022-03-07 PROCEDURE — 73610 X-RAY EXAM OF ANKLE: CPT

## 2022-03-07 PROCEDURE — 73610 X-RAY EXAM OF ANKLE: CPT | Mod: 26,RT

## 2022-03-07 PROCEDURE — 99284 EMERGENCY DEPT VISIT MOD MDM: CPT | Mod: 25

## 2022-03-07 PROCEDURE — 73590 X-RAY EXAM OF LOWER LEG: CPT | Mod: 26,RT

## 2022-03-07 NOTE — ED PROVIDER NOTE - CLINICAL SUMMARY MEDICAL DECISION MAKING FREE TEXT BOX
presents today due to right ankle pain x 1 day. pt reports he was playing football yesterday in which he kicked the football and felt pain. pt reports he kept playing 2 games in which worsened pain. plan includes Xray ankle/tib/fib r/o fx, re-assess. pt declined pain meds.

## 2022-03-07 NOTE — ED ADULT NURSE NOTE - OBJECTIVE STATEMENT
Pt received in stretcher, Alert and oriented, VSS.  Pt reported he was playing football and kicked the ball, after which that evening he started feeling increased pain. Pt reports pain was intense overnight and he couldn't sleep as it was throbbing

## 2022-03-07 NOTE — ED PROVIDER NOTE - NSFOLLOWUPINSTRUCTIONS_ED_ALL_ED_FT
1) Follow-up with Orthopedics, See referred doctor. Call today/next business day for close, prompt follow-up.  2) Return to Emergency room for any worsening or persistent pain, weakness, numbness, fever, color change to extremity, or any other concerning symptoms.  3) Take ibuprofen 600 mg every  6 hours as needed.   4) You can consider discussing with your doctor if physical therapy or further imaging as an MRI may be beneficial.         Ankle Pain      The ankle joint holds your body weight and allows you to move around. Ankle pain can occur on either side or the back of one ankle or both ankles. Ankle pain may be sharp and burning or dull and aching. There may be tenderness, stiffness, redness, or warmth around the ankle. Many things can cause ankle pain, including an injury to the area and overuse of the ankle.      Follow these instructions at home:    Activity     •Rest your ankle as told by your health care provider. Avoid any activities that cause ankle pain.      • Do not use the injured limb to support your body weight until your health care provider says that you can. Use crutches as told by your health care provider.      •Do exercises as told by your health care provider.      •Ask your health care provider when it is safe to drive if you have a brace on your ankle.      If you have a brace:     •Wear the brace as told by your health care provider. Remove it only as told by your health care provider.      •Loosen the brace if your toes tingle, become numb, or turn cold and blue.      •Keep the brace clean.    •If the brace is not waterproof:  •Do not let it get wet.      •Cover it with a watertight covering when you take a bath or shower.          If you were given an elastic bandage:      •Remove it when you take a bath or a shower.      •Try not to move your ankle very much, but wiggle your toes from time to time. This helps to prevent swelling.      •Adjust the bandage to make it more comfortable if it feels too tight.      •Loosen the bandage if you have numbness or tingling in your foot or if your foot turns cold and blue.        Managing pain, stiffness, and swelling    •If directed, put ice on the painful area.  •If you have a removable brace or elastic bandage, remove it as told by your health care provider.      •Put ice in a plastic bag.      •Place a towel between your skin and the bag.      •Leave the ice on for 20 minutes, 2–3 times a day.        •Move your toes often to avoid stiffness and to lessen swelling.      •Raise (elevate) your ankle above the level of your heart while you are sitting or lying down.      General instructions   •Record information about your pain. Writing down the following may be helpful for you and your health care provider:  •How often you have ankle pain.      •Where the pain is located.      •What the pain feels like.        •If treatment involves wearing a prescribed shoe or insole, make sure you wear it correctly and for as long as told by your health care provider.      •Take over-the-counter and prescription medicines only as told by your health care provider.      •Keep all follow-up visits as told by your health care provider. This is important.        Contact a health care provider if:    •Your pain gets worse.      •Your pain is not relieved with medicines.      •You have a fever or chills.      •You are having more trouble with walking.      •You have new symptoms.        Get help right away if:  •Your foot, leg, toes, or ankle:  •Tingles or becomes numb.      •Becomes swollen.      •Turns pale or blue.          Summary    •Ankle pain can occur on either side or the back of one ankle or both ankles.      •Ankle pain may be sharp and burning or dull and aching.      •Rest your ankle as told by your health care provider. If told, apply ice to the area.      •Take over-the-counter and prescription medicines only as told by your health care provider.      This information is not intended to replace advice given to you by your health care provider. Make sure you discuss any questions you have with your health care provider.

## 2022-03-07 NOTE — ED PROVIDER NOTE - PATIENT PORTAL LINK FT
You can access the FollowMyHealth Patient Portal offered by [facility] by registering at the following website: [email or web address]. By joining [employer] [facility]’s FollowMyHealth portal, you will also be able to view your health information using other applications (apps) compatible with our system.

## 2022-03-07 NOTE — ED PROVIDER NOTE - ATTENDING CONTRIBUTION TO CARE
Pt is a 35 yo male playing football yesterday and after kicking off ball, pt began with right ankle pain.  pt continued to play but now with continued diffuse ankle pain worse on the right side of ankle. no knee pain, no numbness or weakness. pt states pain also above ankle in distal tibia region. pt on exam with mild diffuse ankle pain, sm intact, 2+ pulses, pt with no pain on distal tib/fib compression. 2+ pulses.  xray with no fx.  splint ankle , nwb, ice, pain meds, fu ortho

## 2022-03-07 NOTE — ED PROVIDER NOTE - CARE PROVIDER_API CALL
Regis Ramirez (DO)  Orthopaedic Surgery  [address] [place] [address]  Phone: [phone]  Fax: [phone]  Follow Up Time: 1-3 Days

## 2022-03-07 NOTE — ED PROVIDER NOTE - PHYSICAL EXAMINATION
Constitutional: Awake, Alert, non-toxic. NAD. Well appearing, well nourished.   HEAD: Normocephalic, atraumatic.   EYES: EOM intact, conjunctiva and sclera are clear bilaterally.   ENT: No rhinorrhea, patent, mucous membranes pink/moist, no drooling or stridor.   NECK: Supple, non-tender  RESPIRATORY: Normal respiratory effort  EXTREMITIES: Full passive and active ROM in all extremities; (+) lateral ankle TTP, (+) lateral tib/fib TTP, knee non-tender to palpation; negative seven sign, soft compartments, distal pulses palpable and symmetric  SKIN: Warm, dry; good skin turgor, no apparent lesions or rashes, no ecchymosis, brisk capillary refill.  NEURO: A&O x3. Sensory and motor functions are grossly intact. Speech is normal. Appearance and judgement seem appropriate for gender and age.

## 2022-03-07 NOTE — ED PROVIDER NOTE - OBJECTIVE STATEMENT
37 y/o male without reported PMHx presents today due to right ankle pain x 1 day. pt reports he was playing football yesterday in which he kicked the football and felt pain. pt reports he kept playing 2 games in which worsened pain. pt describes pain as aching, radiating up lateral tib/fib, and currently 5/10. pt denies laceration, numbness/weakness, pale foot, or any other complaints.

## 2022-03-28 ENCOUNTER — APPOINTMENT (OUTPATIENT)
Dept: ORTHOPEDIC SURGERY | Facility: CLINIC | Age: 37
End: 2022-03-28
Payer: COMMERCIAL

## 2022-03-28 VITALS — HEIGHT: 72 IN | OXYGEN SATURATION: 98 % | BODY MASS INDEX: 33.18 KG/M2 | WEIGHT: 245 LBS

## 2022-03-28 PROCEDURE — 99214 OFFICE O/P EST MOD 30 MIN: CPT

## 2022-04-01 ENCOUNTER — OUTPATIENT (OUTPATIENT)
Dept: OUTPATIENT SERVICES | Facility: HOSPITAL | Age: 37
LOS: 1 days | End: 2022-04-01
Payer: COMMERCIAL

## 2022-04-01 ENCOUNTER — APPOINTMENT (OUTPATIENT)
Dept: MRI IMAGING | Facility: CLINIC | Age: 37
End: 2022-04-01
Payer: COMMERCIAL

## 2022-04-01 DIAGNOSIS — M25.511 PAIN IN RIGHT SHOULDER: ICD-10-CM

## 2022-04-01 DIAGNOSIS — Z00.8 ENCOUNTER FOR OTHER GENERAL EXAMINATION: ICD-10-CM

## 2022-04-01 PROCEDURE — 73221 MRI JOINT UPR EXTREM W/O DYE: CPT | Mod: 26,RT

## 2022-04-01 PROCEDURE — 73221 MRI JOINT UPR EXTREM W/O DYE: CPT

## 2022-04-13 ENCOUNTER — APPOINTMENT (OUTPATIENT)
Dept: ORTHOPEDIC SURGERY | Facility: CLINIC | Age: 37
End: 2022-04-13
Payer: COMMERCIAL

## 2022-04-13 VITALS — OXYGEN SATURATION: 98 % | WEIGHT: 245 LBS | BODY MASS INDEX: 33.18 KG/M2 | HEIGHT: 72 IN | HEART RATE: 67 BPM

## 2022-04-13 PROCEDURE — 99214 OFFICE O/P EST MOD 30 MIN: CPT

## 2022-04-19 ENCOUNTER — APPOINTMENT (OUTPATIENT)
Dept: ORTHOPEDIC SURGERY | Facility: CLINIC | Age: 37
End: 2022-04-19
Payer: COMMERCIAL

## 2022-04-19 PROCEDURE — 99214 OFFICE O/P EST MOD 30 MIN: CPT

## 2022-04-20 NOTE — HISTORY OF PRESENT ILLNESS
[de-identified] : CC Right shoulder\par \par HPI 37-year-old male right HD presents with chronic onset of many years of activity related pain in the anterior right shoulder without injury. The pain is worse between the 2 and 8 out of 10, without radiation. Rest makes the pain better and specific shoulder motions behind the back and across the body makes the pain worse. The patient reports associated symptoms of severe pain. The patient reports occasional pain at night affecting sleep, [Denies] neck pain, and reports similar pain previously.\par \par The patient has tried the following treatments:\par Activity modification	+\par Ice			+\par Nsaids    		+\par Physical Therapy  	+ multiple rounds of physical therapy with minimal to no improvement\par Cortisone Injection	+ corticosteroid injection by Dr. Pérez with 1 to 2 weeks of improvement\par Arthroscopy/Surgery	[-]\par \par Review of Systems is positive for the above musculoskeletal symptoms and is otherwise non-contributory for general, constitutional, psychiatric, neurologic, HEENT, cardiac, respiratory, gastrointestinal, reproductive, lymphatic, and dermatologic complaints.\par \par Consult by  [ ]

## 2022-04-20 NOTE — DISCUSSION/SUMMARY
[de-identified] : Right shoulder moderate grade interstitial anterior supraspinatus tear, biceps tendinitis/SLAP tear, subcoracoid and subacromial impingement\par \par I discussed my findings and history exam and radiology at length with the patient\par \par I discussed the findings on the MRI versus the physical examination with the patient at length\par \par I discussed the patient's expectations of surgical outcome versus the possible expected outcomes of the surgery based upon the physical examination and MRI findings\par \par There are multiple findings on the MRI which may be causing the patient's symptoms including subcoracoid impingement, biceps tendinitis/SLAP tear, partial supraspinatus tear.  Given the multiple sources of pain compared to the patient's examination which appears to focus more upon the biceps/SLAP at the pathology however the subcoracoid impingement is in a near adjacent position as is the anterior supraspinatus tear complicating the diagnosis.\par \par Benefits risks and alternatives of continued nonoperative management versus operative management discussed.  The patient has failed to improve with all nonoperative management to date\par \par Discussed further nonoperative management including corticosteroid injection of the biceps or subcoracoid region.\par \par Patient wishes to think about surgical management\par \par Follow-up as needed

## 2022-04-20 NOTE — PHYSICAL EXAM
[de-identified] : Physical Examination\par General: well nourished, in no acute distress, alert and oriented to person, place and time\par Psychiatric: normal mood and affect, no abnormal movements or speech patterns\par Eyes: vision intact - glasses\par \par Musculoskeletal Examination\par Cervical spine	Full painless range of motion and negative Spurling's test\par \par Shoulder			Right			Left\par Appearance\par      Skin/Swelling/Deformity	normal			normal\par      Scapular Winging		-			-\par Range of Motion\par      Forward Flexion		170 / 170		170 / 170\par      Abduction			170 / 170		170 / 170\par      External Rotation		60			60\par      Internal Rotation		T10			T10\par      SAbd Ext Rotation		90			90\par      SAbd Int Rotation		80			80\par      Painful Arc			+			-\par      Crepitus			-			-\par Palpation\par      Clavicle			-			-\par      AC Joint			-			-\par      Posterior Acromion		-			-\par      Levator Scapula		-			-\par      Lateral Bursa			-			-\par      Impingement Area		-			-\par      Biceps Tendon		+			-\par      Anterior Capsule		+			-\par Strength Examination\par      Supraspinatous 		5+ / 0			5+ / 0\par      Infraspinatous			5+ / 0			5+ / 0\par      Subscapularis			5+ / 0			5+ / 0\par      Belly Press			5+ / 0			5+ / 0\par      Lift Off			-			-\par      Drop-Arm			-			-\par Special Examination\par      Biceps [place]		+ , + speed			-\par      Impingement Neer		-			-\par      Impingement [place]		-			-\par \par      Apprehension			-			-\par           Suppression Appre		-			-\par      Anterior Subluxation		-			-\par      Posterior Subluxation		-			-\par      AC Cross-Body\par           Anterior			-			-\par           Posterior			-			-\par \par Sensation\par      Axillary			normal			normal\par      LatAntCubBrach 		normal			normal\par      Median 			normal			normal\par      Ulnar 			normal			normal\par      Radial 			normal			normal\par Motor\par      AIN 				normal			normal\par      Ulnar 			normal			normal\par      Radial 			normal			normal\par      PIN 				normal			normal\par Pulses\par      Radial			2+			2+\par  [de-identified] : MRI RIGHT shoulder from [facility] on 4-1-22\par My impression of the images:\par Quality of the MRI is good\par Supraspinatous Tendon anterior bursal or interstitial footprint tear\par Infraspinatous Tendon ok\par Subscapularis Tendon tendinosis w edema in coracoid 6-7mm LT-CC distance\par Teres Minor Tendon ok\par Muscle Belly Atrophy none\par Biceps Tendon is  in the groove and looks ok intra-articularly but poorly visualized with a stable attachment anchor\par Superior Labrum tear\par Anterior Labrum ok\par Posterior Labrum ok\par AC joint ok\par There is no full thickness chondral lesion of the glenoid and humeral head\par \par The Final Radiologist Impression:\par \par \par Rotator cuff: There is focal high-grade partial thickness tearing of the anterior leading edge of the supraspinatus tendon at its insertion. There is no tendon retraction. This is superimposed on moderate supraspinatus tendinosis. There is mild adjacent reactive marrow edema in the anterior aspect of the greater tuberosity. The other rotator cuff tendons are intact. There is no rotator cuff muscle atrophy.\par \par Bursa: There is a small amount of fluid and thickening in the subacromial subdeltoid bursa, consistent with mild bursitis.\par \par Biceps: The long head of the biceps tendon is intact. There is no biceps subluxation.\par \par Glenohumeral joint and Labrum: There is a physiologic amount of glenohumeral joint fluid. There is a SLAP tear. Articular cartilage is preserved. There are no subchondral abnormalities.\par \par Acromioclavicular joint: The acromioclavicular joint is intact.\par \par Bones: There is no fracture or osteonecrosis. Reactive marrow edema in the anterior aspect of the greater tuberosity.\par \par IMPRESSION: Focal high-grade partial thickness tearing of the anterior leading edge of the supraspinatus tendon at its insertion. No tendon retraction.\par Subacromial subdeltoid bursitis.\par SLAP tear.

## 2022-04-22 ENCOUNTER — OUTPATIENT (OUTPATIENT)
Dept: OUTPATIENT SERVICES | Facility: HOSPITAL | Age: 37
LOS: 1 days | End: 2022-04-22
Payer: COMMERCIAL

## 2022-04-22 VITALS
TEMPERATURE: 98 F | RESPIRATION RATE: 20 BRPM | DIASTOLIC BLOOD PRESSURE: 83 MMHG | SYSTOLIC BLOOD PRESSURE: 135 MMHG | OXYGEN SATURATION: 96 % | WEIGHT: 248.02 LBS | HEART RATE: 83 BPM | HEIGHT: 72 IN

## 2022-04-22 DIAGNOSIS — M75.111 INCOMPLETE ROTATOR CUFF TEAR OR RUPTURE OF RIGHT SHOULDER, NOT SPECIFIED AS TRAUMATIC: ICD-10-CM

## 2022-04-22 DIAGNOSIS — Z11.52 ENCOUNTER FOR SCREENING FOR COVID-19: ICD-10-CM

## 2022-04-22 DIAGNOSIS — Z98.890 OTHER SPECIFIED POSTPROCEDURAL STATES: Chronic | ICD-10-CM

## 2022-04-22 DIAGNOSIS — M75.101 UNSPECIFIED ROTATOR CUFF TEAR OR RUPTURE OF RIGHT SHOULDER, NOT SPECIFIED AS TRAUMATIC: ICD-10-CM

## 2022-04-22 LAB — SARS-COV-2 RNA SPEC QL NAA+PROBE: SIGNIFICANT CHANGE UP

## 2022-04-22 PROCEDURE — U0003: CPT

## 2022-04-22 PROCEDURE — C9803: CPT

## 2022-04-22 PROCEDURE — G0463: CPT

## 2022-04-22 PROCEDURE — U0005: CPT

## 2022-04-22 RX ORDER — SODIUM CHLORIDE 9 MG/ML
1000 INJECTION, SOLUTION INTRAVENOUS
Refills: 0 | Status: DISCONTINUED | OUTPATIENT
Start: 2022-04-25 | End: 2022-05-09

## 2022-04-22 NOTE — H&P PST ADULT - MUSCULOSKELETAL
November 14, 2019     [facility]  [address] [facility]      Dear [place]:    Below are the results from your recent visit:  CONCLUSION:   1. Osteoarthritis as described. 2. No suspicious bone lesion or fracture.     Resulted Orders   XR FIN details… decreased ROM due to pain/diminished strength detailed exam

## 2022-04-22 NOTE — H&P PST ADULT - HISTORY OF PRESENT ILLNESS
37 yr old male with history of Right shoulder for past 3 years, had tried ijections & PT with minimal relief . Now coming in for Right shoulder arthrscopy, Possible rotattor cuff repair vs debridement, Arthroscopic biceps tenodesis vs tenotomy , Subacromial and subcoracoid debridement on 4/25/2022.    Denies Recent travel, Exposure or Covid symptoms  Covid test- done 4/22/2022   37 yr old male with history of Right shoulder for past 3 years, had tried injections & PT with minimal relief . Pt is c/o difficult ROM to back & up . Now coming in for Right shoulder arthroscopy Possible rotator cuff repair vs debridement, Arthroscopic biceps tenodesis vs tenotomy , Subacromial and subcoracoid debridement on 4/25/2022.    Denies Recent travel, Exposure or Covid symptoms  Covid test- done 4/22/2022

## 2022-04-22 NOTE — H&P PST ADULT - FALL HARM RISK - UNIVERSAL INTERVENTIONS
Bed in lowest position, wheels locked, appropriate side rails in place/Call bell, personal items and telephone in reach/Instruct patient to call for assistance before getting out of bed or chair/Non-slip footwear when patient is out of bed/[place] to call system/Physically safe environment - no spills, clutter or unnecessary equipment/Purposeful Proactive Rounding/Room/bathroom lighting operational, light cord in reach

## 2022-04-22 NOTE — H&P PST ADULT - PROBLEM SELECTOR PLAN 1
Right shoulder arthroscopy Possible rotator cuff repair vs debridement, Arthroscopic biceps tenodesis vs tenotomy , Subacromial and subcoracoid debridement on 4/25/2022.

## 2022-04-24 ENCOUNTER — TRANSCRIPTION ENCOUNTER (OUTPATIENT)
Age: 37
End: 2022-04-24

## 2022-04-25 ENCOUNTER — OUTPATIENT (OUTPATIENT)
Dept: OUTPATIENT SERVICES | Facility: HOSPITAL | Age: 37
LOS: 1 days | End: 2022-04-25
Payer: COMMERCIAL

## 2022-04-25 ENCOUNTER — TRANSCRIPTION ENCOUNTER (OUTPATIENT)
Age: 37
End: 2022-04-25

## 2022-04-25 ENCOUNTER — APPOINTMENT (OUTPATIENT)
Dept: ORTHOPEDIC SURGERY | Facility: HOSPITAL | Age: 37
End: 2022-04-25

## 2022-04-25 VITALS
RESPIRATION RATE: 16 BRPM | SYSTOLIC BLOOD PRESSURE: 125 MMHG | OXYGEN SATURATION: 99 % | HEART RATE: 65 BPM | DIASTOLIC BLOOD PRESSURE: 75 MMHG | HEIGHT: 72 IN | WEIGHT: 248.02 LBS | TEMPERATURE: 97 F

## 2022-04-25 VITALS
HEART RATE: 93 BPM | SYSTOLIC BLOOD PRESSURE: 127 MMHG | DIASTOLIC BLOOD PRESSURE: 69 MMHG | RESPIRATION RATE: 17 BRPM | OXYGEN SATURATION: 95 %

## 2022-04-25 DIAGNOSIS — M75.111 INCOMPLETE ROTATOR CUFF TEAR OR RUPTURE OF RIGHT SHOULDER, NOT SPECIFIED AS TRAUMATIC: ICD-10-CM

## 2022-04-25 DIAGNOSIS — Z98.890 OTHER SPECIFIED POSTPROCEDURAL STATES: Chronic | ICD-10-CM

## 2022-04-25 PROCEDURE — 29827 SHO ARTHRS SRG RT8TR CUF RPR: CPT | Mod: RT

## 2022-04-25 PROCEDURE — 29823 SHO ARTHRS SRG XTNSV DBRDMT: CPT | Mod: RT

## 2022-04-25 PROCEDURE — 29828 SHO ARTHRS SRG BICP TENODSIS: CPT | Mod: RT

## 2022-04-25 PROCEDURE — 29826 SHO ARTHRS SRG DECOMPRESSION: CPT | Mod: RT

## 2022-04-25 PROCEDURE — C1713: CPT

## 2022-04-25 DEVICE — IMPLANTABLE DEVICE: Type: IMPLANTABLE DEVICE | Site: RIGHT | Status: FUNCTIONAL

## 2022-04-25 DEVICE — IMP Q FIX 1.8 MINI: Type: IMPLANTABLE DEVICE | Site: RIGHT | Status: FUNCTIONAL

## 2022-04-25 DEVICE — ANCHOR SUT FOOTPRINT ULTRA PK: Type: IMPLANTABLE DEVICE | Site: RIGHT | Status: FUNCTIONAL

## 2022-04-25 DEVICE — SUT ANCHOR MINI QFIX 1.8MM: Type: IMPLANTABLE DEVICE | Site: RIGHT | Status: FUNCTIONAL

## 2022-04-25 DEVICE — ANCHOR SUT Q FIX 2.8MM: Type: IMPLANTABLE DEVICE | Site: RIGHT | Status: FUNCTIONAL

## 2022-04-25 RX ORDER — FENTANYL CITRATE 50 UG/ML
25 INJECTION INTRAVENOUS
Refills: 0 | Status: DISCONTINUED | OUTPATIENT
Start: 2022-04-25 | End: 2022-04-25

## 2022-04-25 RX ORDER — SODIUM CHLORIDE 9 MG/ML
1000 INJECTION, SOLUTION INTRAVENOUS
Refills: 0 | Status: DISCONTINUED | OUTPATIENT
Start: 2022-04-25 | End: 2022-05-09

## 2022-04-25 RX ORDER — LIDOCAINE HCL 20 MG/ML
0.2 VIAL (ML) INJECTION ONCE
Refills: 0 | Status: COMPLETED | OUTPATIENT
Start: 2022-04-25 | End: 2022-04-25

## 2022-04-25 RX ORDER — ASPIRIN/CALCIUM CARB/MAGNESIUM 324 MG
1 TABLET ORAL
Qty: 30 | Refills: 0
Start: 2022-04-25 | End: 2022-05-24

## 2022-04-25 RX ORDER — OXYCODONE HYDROCHLORIDE 5 MG/1
5 TABLET ORAL ONCE
Refills: 0 | Status: DISCONTINUED | OUTPATIENT
Start: 2022-04-25 | End: 2022-04-25

## 2022-04-25 RX ORDER — CHLORHEXIDINE GLUCONATE 213 G/1000ML
1 SOLUTION TOPICAL ONCE
Refills: 0 | Status: COMPLETED | OUTPATIENT
Start: 2022-04-25 | End: 2022-04-25

## 2022-04-25 RX ORDER — ONDANSETRON 8 MG/1
4 TABLET, FILM COATED ORAL ONCE
Refills: 0 | Status: COMPLETED | OUTPATIENT
Start: 2022-04-25 | End: 2022-04-25

## 2022-04-25 RX ORDER — CEPHALEXIN 500 MG
1 CAPSULE ORAL
Qty: 15 | Refills: 0
Start: 2022-04-25 | End: 2022-04-29

## 2022-04-25 RX ORDER — SENNA PLUS 8.6 MG/1
1 TABLET ORAL
Qty: 14 | Refills: 0
Start: 2022-04-25 | End: 2022-05-08

## 2022-04-25 RX ADMIN — ONDANSETRON 4 MILLIGRAM(S): 8 TABLET, FILM COATED ORAL at 16:31

## 2022-04-25 RX ADMIN — CHLORHEXIDINE GLUCONATE 1 APPLICATION(S): 213 SOLUTION TOPICAL at 08:44

## 2022-04-25 RX ADMIN — SODIUM CHLORIDE 100 MILLILITER(S): 9 INJECTION, SOLUTION INTRAVENOUS at 08:44

## 2022-04-25 NOTE — ASU DISCHARGE PLAN (ADULT/PEDIATRIC) - CARE PROVIDER_API CALL
Graeme Bonner)  Orthopaedic Surgery  [address] [facility], [address]st [place] [address]  Phone: [phone]  Fax: [phone]  Follow Up Time:

## 2022-04-25 NOTE — ASU DISCHARGE PLAN (ADULT/PEDIATRIC) - NURSING INSTRUCTIONS
You can take Tylenol or Motrin at ANY TIME TODAY FOR PAIN AND EVERY 6 HOURS AS NEEDS. If taking both please take 3 hours apart (ex. Tylenol @ 10am, Motrin @ 1pm). IF TAKING PERCOCET (OXYCODONE-ACETAMINOPHEN) DO NOT TAKE TYLENOL AS PERCOCET CONTAINS TYLENOL.

## 2022-04-25 NOTE — ASU DISCHARGE PLAN (ADULT/PEDIATRIC) - ASU DC SPECIAL INSTRUCTIONSFT
Follow up with Dr. Bonner in 2 weeks  Keep dressing clean, dry, and intact until follow up visit  Nonweight bearing right upper extremity in sling at all times  You may ice the shoulder (20 minutes ice on, 20 minutes off).   Take the percocet as needed for severe pain.   You may take Ibuprofen and Tylenol as needed.   Aspirin has been prescribed for DVT prophylaxis.   Senna has been prescribed for stool softener.  You may sponge bathe but keep dressing dry. Follow up with Dr. Ni in 2 weeks  ******************************************************************************************   Keep dressing clean, dry, and intact until follow up visit  ******************************************************************************************   Nonweight bearing right upper extremity in sling at all times  ******************************************************************************************   You may ice the shoulder (20 minutes ice on, 20 minutes off).   ******************************************************************************************   Take the percocet as needed for severe pain.   ******************************************************************************************   You may take Ibuprofen and Tylenol as needed.   ******************************************************************************************   Aspirin has been prescribed for DVT prophylaxis.   ******************************************************************************************   Senna has been prescribed for stool softener.  ******************************************************************************************   You may sponge bathe but keep dressing dry.

## 2022-04-25 NOTE — ASU PATIENT PROFILE, ADULT - FALL HARM RISK - UNIVERSAL INTERVENTIONS
Bed in lowest position, wheels locked, appropriate side rails in place/Call bell, personal items and telephone in reach/Instruct patient to call for assistance before getting out of bed or chair/Non-slip footwear when patient is out of bed/[place] to call system/Physically safe environment - no spills, clutter or unnecessary equipment/Purposeful Proactive Rounding/Room/bathroom lighting operational, light cord in reach

## 2022-04-25 NOTE — ASU DISCHARGE PLAN (ADULT/PEDIATRIC) - NS MD DC FALL RISK RISK
For information on Fall & Injury Prevention, visit: [email or web address] OR  [email or web address] OR  https://www.cdc.gov/steadi/patient.html

## 2022-05-06 ENCOUNTER — APPOINTMENT (OUTPATIENT)
Dept: ORTHOPEDIC SURGERY | Facility: CLINIC | Age: 37
End: 2022-05-06
Payer: COMMERCIAL

## 2022-05-06 PROBLEM — M75.101 UNSPECIFIED ROTATOR CUFF TEAR OR RUPTURE OF RIGHT SHOULDER, NOT SPECIFIED AS TRAUMATIC: Chronic | Status: ACTIVE | Noted: 2022-04-22

## 2022-05-06 PROCEDURE — 99024 POSTOP FOLLOW-UP VISIT: CPT

## 2022-05-06 RX ORDER — DICLOFENAC SODIUM 50 MG/1
50 TABLET, DELAYED RELEASE ORAL
Qty: 60 | Refills: 1 | Status: ACTIVE | COMMUNITY
Start: 2022-05-06 | End: 1900-01-01

## 2022-05-06 NOTE — HISTORY OF PRESENT ILLNESS
[de-identified] : Patient is status post Right Shoulder Arthroscopic surgery on 4-\par      3 anchor high-grade bursal sided completion repair double row\par      [ Arthroscopic Biceps Tenodesis ]\par      [ Subacromial decompression ]\par      Subcoracoid decompression\par \par \par The patient is doing well with improved pain postoperatively, is [ not ] taking narcotics for pain.\par They have been doing postoperative icing, compressive dressing and exercises as directed with improving swelling, pain and motion.\par They have been complaint with postoperative sling immobilization\par They are taking ASA as directed for postoperative DVT ppx, Abx ppx completed\par \par The patient denies shortness of breath, chest pain, numbness tingling, worsening calf pain or swelling.\par \par Right Upper Ext\par \par Incisions are intact\par There is no erythema induration warmth or tenderness about the incisions\par There is mild swelling remaining [ and ecchymosis ]\par Shoulder pendulum motion is slightly stiff\par FF 75\par ER-10\par Elbow ROM is [ full ]\par Motor is intact AIN/PIN/Ulnar/Radial\par Sensory intact median/ulnar/radial distributions\par Palpable radial pulse with brisk capillary refill\par \par \par \par Assessment Plan\par 2 weeks status post Right Shoulder Arthroscopic surgery on 4-\par      3 anchor high-grade bursal sided completion repair double row\par      [ Arthroscopic Biceps Tenodesis ]\par      [ Subacromial decompression ]\par      Subcoracoid decompression\par \par Band-Aids applied\par Pendulum exercises demonstrated and practiced, do 5-10x daily\par Continue postoperative exercises\par \par WB status RUE\par      Continue NWB for 6 weeks total\par      Progress 5lbs WB for weeks 6-12\par      Advance 15lbs WB for months 3-5\par      Activities as tolerated months 5+\par \par Start physical therapy\par \par Continue icing the shoulder for pain and swelling\par Continue shower, bathing w submersion of incision ok at 2 weeks\par \par Continue ASA DVT ppx for 1 month\par Abx ppx completed\par \par Surgery and arthroscopic images reviewed and provided for patient\par \par Follow up:\par 2 weeks

## 2022-05-25 ENCOUNTER — APPOINTMENT (OUTPATIENT)
Dept: ORTHOPEDIC SURGERY | Facility: CLINIC | Age: 37
End: 2022-05-25

## 2022-05-27 ENCOUNTER — APPOINTMENT (OUTPATIENT)
Dept: ORTHOPEDIC SURGERY | Facility: CLINIC | Age: 37
End: 2022-05-27
Payer: COMMERCIAL

## 2022-05-27 PROCEDURE — 99024 POSTOP FOLLOW-UP VISIT: CPT

## 2022-05-27 NOTE — HISTORY OF PRESENT ILLNESS
[de-identified] : Patient is status post Right Shoulder Arthroscopic surgery on 4-\par      3 anchor high-grade bursal sided completion repair double row\par      [ Arthroscopic Biceps Tenodesis ]\par      [ Subacromial decompression ]\par      Subcoracoid decompression\par \par \par The patient is doing well 3/10 pain w motion.\par They have been doing postoperative icing, compressive dressing and exercises as directed with improving swelling, pain and motion.\par They have been complaint with postoperative sling immobilization\par They are taking ASA as directed for postoperative DVT ppx, Abx ppx completed\par \par The patient denies shortness of breath, chest pain, numbness tingling, worsening calf pain or swelling.\par \par Right Upper Ext\par \par Incisions are intact\par There is no erythema induration warmth or tenderness about the incisions\par There is mild swelling remaining [ and ecchymosis ]\par Shoulder pendulum motion is slightly stiff\par FF 90\par ER 0\par Elbow ROM is [ full ]\par Motor is intact AIN/PIN/Ulnar/Radial\par Sensory intact median/ulnar/radial distributions\par Palpable radial pulse with brisk capillary refill\par \par \par \par Assessment Plan\par 4 weeks status post Right Shoulder Arthroscopic surgery on 4-\par      3 anchor high-grade bursal sided completion repair double row\par      [ Arthroscopic Biceps Tenodesis ]\par      [ Subacromial decompression ]\par      Subcoracoid decompression\par \par \par Pendulum exercises demonstrated and practiced, do 5-10x daily\par Continue postoperative exercises\par \par WB status RUE\par      Continue NWB for 6 weeks total\par      Progress 5lbs WB for weeks 6-12\par      Advance 15lbs WB for months 3-5\par      Activities as tolerated months 5+\par \par Start physical therapy\par \par advance shoulder rom\par \par \par Follow up:\par 2 weeks

## 2022-06-24 ENCOUNTER — APPOINTMENT (OUTPATIENT)
Dept: ORTHOPEDIC SURGERY | Facility: CLINIC | Age: 37
End: 2022-06-24
Payer: COMMERCIAL

## 2022-06-24 DIAGNOSIS — M75.41 IMPINGEMENT SYNDROME OF RIGHT SHOULDER: ICD-10-CM

## 2022-06-24 DIAGNOSIS — M75.21 BICIPITAL TENDINITIS, RIGHT SHOULDER: ICD-10-CM

## 2022-06-24 DIAGNOSIS — M25.811 OTHER SPECIFIED JOINT DISORDERS, RIGHT SHOULDER: ICD-10-CM

## 2022-06-24 DIAGNOSIS — S43.439A SUPERIOR GLENOID LABRUM LESION OF UNSPECIFIED SHOULDER, INITIAL ENCOUNTER: ICD-10-CM

## 2022-06-24 PROCEDURE — 99024 POSTOP FOLLOW-UP VISIT: CPT

## 2022-06-24 RX ORDER — DICLOFENAC SODIUM 50 MG/1
50 TABLET, DELAYED RELEASE ORAL
Qty: 60 | Refills: 1 | Status: ACTIVE | COMMUNITY
Start: 2022-06-24 | End: 1900-01-01

## 2022-06-24 NOTE — HISTORY OF PRESENT ILLNESS
[de-identified] : Patient is status post Right Shoulder Arthroscopic surgery on 4-\par      3 anchor high-grade bursal sided completion repair double row\par      [ Arthroscopic Biceps Tenodesis ]\par      [ Subacromial decompression ]\par      Subcoracoid decompression\par \par \par The patient is doing well with minimal to no pain.\par much improved motion\par \par The patient denies shortness of breath, chest pain, numbness tingling, worsening calf pain or swelling.\par \par Right Upper Ext\par \par Incisions are intact\par There is no erythema induration warmth or tenderness about the incisions\par There is mild swelling remaining [ and ecchymosis ]\par Shoulder pendulum motion is smooth\par \par ER 15\par SS 5-\par IS 5+\par Elbow ROM is [ full ]\par Motor is intact AIN/PIN/Ulnar/Radial\par Sensory intact median/ulnar/radial distributions\par Palpable radial pulse with brisk capillary refill\par \par \par \par Assessment Plan\par 8 weeks status post Right Shoulder Arthroscopic surgery on 4-\par      3 anchor high-grade bursal sided completion repair double row\par      [ Arthroscopic Biceps Tenodesis ]\par      [ Subacromial decompression ]\par      Subcoracoid decompression\par \par WB status RUE\par      Progress 5lbs WB for weeks 6-12\par      Advance 15lbs WB for months 3-5\par      Activities as tolerated months 5+\par \par physical therapy\par \par diclofenac\par lightduty 7-5-22 2 mo\par \par Follow up:\par 8 weeks

## 2022-08-08 ENCOUNTER — APPOINTMENT (OUTPATIENT)
Dept: ORTHOPEDIC SURGERY | Facility: CLINIC | Age: 37
End: 2022-08-08

## 2022-08-08 VITALS
OXYGEN SATURATION: 97 % | HEART RATE: 68 BPM | BODY MASS INDEX: 33.18 KG/M2 | HEIGHT: 72 IN | SYSTOLIC BLOOD PRESSURE: 146 MMHG | WEIGHT: 245 LBS | DIASTOLIC BLOOD PRESSURE: 94 MMHG

## 2022-08-08 DIAGNOSIS — M75.111 INCOMPLETE ROTATOR CUFF TEAR OR RUPTURE OF RIGHT SHOULDER, NOT SPECIFIED AS TRAUMATIC: ICD-10-CM

## 2022-08-08 PROCEDURE — 99212 OFFICE O/P EST SF 10 MIN: CPT

## 2022-12-30 NOTE — ED ADULT TRIAGE NOTE - DATE OF FIRST COVID-19 BOOSTER
[address] [place] [address] Court  eMERGENCY dEPARTMENT eNCOUnter      Pt Name: Art Iqbal  MRN: [id number]  [place] 1995  Date of evaluation: 12/29/2022  Provider: Rodolfo Cartwright DO    CHIEF COMPLAINT       Chief Complaint   Patient presents with    Emesis     C/o n/v,head congestion,h/a. Onset of sx's 4 days ago. HISTORY OF PRESENT ILLNESS   (Location/Symptom, Timing/Onset, Context/Setting, Quality, Duration, Modifying Factors, Severity)  Note limiting factors. Art Iqbal is a 24 y.o. female who presents to the emergency department for having n/v and URI symptoms. Started on Monday, today had n/v X 2, 1st time vomiting was on Sunday. Started out feeling weak, nasal congestion, and tightness in her head. Same symptoms on Mon/Tues. Same on Wed. Today headache got worse and vomiting again. States that a lot of viruses going around at work. No diarrhea. No sore throat. Pt with cough. No ear pain. No chest pain, no abdominal pain. No dysuria, flank pain, or body aches/back pain. Denies fever. Nursing Notes were reviewed. REVIEW OF SYSTEMS    (2-9 systems for level 4, 10 or more forlevel 5)     Review of Systems   HENT:  Positive for congestion. Respiratory:  Positive for cough. Gastrointestinal:  Positive for nausea and vomiting. Neurological:  Positive for weakness and headaches. All other systems reviewed and are negative. PAST MEDICAL HISTORY     Past Medical History:   Diagnosis Date    Headache          SURGICAL HISTORY       Past Surgical History:   Procedure Laterality Date    DILATION AND CURETTAGE OF UTERUS  2012    NECK SURGERY      at age 10    TONSILLECTOMY           CURRENT MEDICATIONS       Previous Medications    No medications on file       ALLERGIES     Zofran [ondansetron hcl]    FAMILY HISTORY     History reviewed. No pertinent family history.        SOCIAL HISTORY       Social History     Socioeconomic History    Marital status: Single Spouse name: None    Number of children: None    Years of education: None    Highest education level: None   Tobacco Use    Smoking status: Every Day     Packs/day: 1.00     Years: 6.00     Pack years: 6.00     Types: Cigarettes     Last attempt to quit: 2017     Years since quittin.6    Smokeless tobacco: Never   Vaping Use    Vaping Use: Never used   Substance and Sexual Activity    Alcohol use: No    Drug use: No    Sexual activity: Yes     Partners: Male       SCREENINGS    Sebastian Coma Scale  Eye Opening: Spontaneous  Best Verbal Response: Oriented  Best Motor Response: Obeys commands  Sebastian Coma Scale Score: 15        PHYSICAL EXAM    (up to 7 for level 4, 8 or more for level 5)     ED Triage Vitals [22]   BP Temp Temp Source Heart Rate Resp SpO2 Height Weight   120/78 98.2 °F (36.8 °C) Oral 68 16 98 % 5' 5\" (1.651 m) 130 lb (59 kg)       Physical Exam  Vitals and nursing note reviewed. Constitutional:       General: She is not in acute distress. Appearance: Normal appearance. She is not ill-appearing. HENT:      Head: Normocephalic. Right Ear: Tympanic membrane normal.      Left Ear: Tympanic membrane normal.      Nose: Nose normal.      Mouth/Throat:      Mouth: Mucous membranes are moist.      Pharynx: Oropharynx is clear. Eyes:      Extraocular Movements: Extraocular movements intact. Conjunctiva/sclera: Conjunctivae normal.      Pupils: Pupils are equal, round, and reactive to light. Cardiovascular:      Rate and Rhythm: Normal rate and regular rhythm. Heart sounds: Normal heart sounds. Pulmonary:      Effort: Pulmonary effort is normal.      Breath sounds: Normal breath sounds. Abdominal:      Palpations: Abdomen is soft. Tenderness: There is no abdominal tenderness. Musculoskeletal:         General: Normal range of motion. Cervical back: Neck supple. Skin:     General: Skin is warm and dry.    Neurological:      Mental Status: She is alert and oriented to person, place, and time. Psychiatric:         Mood and Affect: Mood normal.         Behavior: Behavior normal.       DIAGNOSTIC RESULTS     EKG: All EKG's are interpreted by the Emergency Department Physician who either signs or Co-signs this chart in the absence of a cardiologist.    None    RADIOLOGY:   Non-plain film images such as CT, Ultrasound and MRI are read by the radiologist. Plain radiographic images are visualized andpreliminarily interpreted by the emergency physician with the below findings:    None    Interpretationper the Radiologist below, if available at the time of this note:    No orders to display         ED BEDSIDE ULTRASOUND:   Performed by ED Physician - none    LABS:  Labs Reviewed   RAPID INFLUENZA A/B ANTIGENS   STREP SCREEN GROUP A THROAT   COVID-19, RAPID       All other labs were within normal range or not returned as of this dictation. EMERGENCY DEPARTMENT COURSE and DIFFERENTIAL DIAGNOSIS/MDM:   :    Vitals:    12/29/22 2128   BP: 120/78   Pulse: 68   Resp: 16   Temp: 98.2 °F (36.8 °C)   TempSrc: Oral   SpO2: 98%   Weight: 130 lb (59 kg)   Height: 5' 5\" (1.651 m)           CRITICAL CARE TIME   Total Critical Care time was 0 minutes, excluding separatelyreportable procedures. There was a high probability ofclinically significant/life threatening deterioration in the patient's condition which required my urgent intervention. CONSULTS:  None    PROCEDURES:  None    PROGRESS NOTES:    Pt with URI symptoms. Pt been sick for about 5 days. Pt most likely with acute viral process. Will d/c with cough meds and phenergan. Will give work note    Is this patient to be included in the SEP-1 Core Measure due to severe sepsis or septic shock?    No   Exclusion criteria - the patient is NOT to be included for SEP-1 Core Measure due to:  Viral etiology found or highly suspected (including COVID-19) without concomitant bacterial infection     FINAL IMPRESSION      1. Nausea and vomiting, unspecified vomiting type New Problem   2. Acute cough New Problem   3. Viral upper respiratory illness New Problem         DISPOSITION/PLAN   DISPOSITION Decision To Discharge 12/29/2022 10:52:31 PM      PATIENT REFERRED TO:    Follow up with primary care in next couple days if symptoms continue or worsen. Cough meds and phenergan given. Work note given.         DISCHARGE MEDICATIONS:  New Prescriptions    GUAIFENESIN-DEXTROMETHORPHAN (ROBITUSSIN DM) 100-10 MG/5ML SYRUP    Take 5 mLs by mouth every 4-6 hours as needed for Cough    PROMETHAZINE (PHENERGAN) 25 MG TABLET    Take 1 tablet by mouth every 6 hours as needed for Nausea       (Please note that portions of this note were completed with a voice recognition program.  Efforts were made to edit the dictations but occasionallywords are mis-transcribed.)    Malcolm Olson DO (electronically signed)  Attending Emergency Physician          Malcolm Olson,   12/29/22 [address] DO  12/29/22 5330 07-Feb-2022

## 2023-01-04 NOTE — HISTORY OF PRESENT ILLNESS
[FreeTextEntry1] : Patient seen for follow up of right heel puncture wound. Relates he was able to obtain the mri as advised. denies any other complaints and notes that his heel feels better. Date Of Previous Biopsy (Optional): 11/2/22

## 2023-02-21 NOTE — ED PROVIDER NOTE - DISCHARGE DATE
[de-identified] : bum,p on L eye x 1 day [FreeTextEntry6] : noted yesterday and is bigger today\par no d/c\par not itchy but is tender when touched\par afebrile\par no cough or congestion 07-Mar-2022

## 2023-06-13 ENCOUNTER — APPOINTMENT (OUTPATIENT)
Dept: ORTHOPEDIC SURGERY | Facility: CLINIC | Age: 38
End: 2023-06-13
Payer: COMMERCIAL

## 2023-06-13 VITALS — BODY MASS INDEX: 32.51 KG/M2 | WEIGHT: 240 LBS | HEIGHT: 72 IN

## 2023-06-13 DIAGNOSIS — S46.911A STRAIN OF UNSPECIFIED MUSCLE, FASCIA AND TENDON AT SHOULDER AND UPPER ARM LEVEL, RIGHT ARM, INITIAL ENCOUNTER: ICD-10-CM

## 2023-06-13 PROCEDURE — 99213 OFFICE O/P EST LOW 20 MIN: CPT

## 2023-06-13 RX ORDER — MELOXICAM 15 MG/1
15 TABLET ORAL
Qty: 30 | Refills: 1 | Status: ACTIVE | COMMUNITY
Start: 2023-06-13 | End: 1900-01-01

## 2023-09-28 NOTE — ED PROVIDER NOTE - RESPIRATORY, MLM
Breath sounds clear and equal bilaterally. Bexarotene Pregnancy And Lactation Text: This medication is Pregnancy Category X and should not be given to women who are pregnant or may become pregnant. This medication should not be used if you are breast feeding.

## 2024-01-02 ENCOUNTER — NON-APPOINTMENT (OUTPATIENT)
Age: 39
End: 2024-01-02

## 2024-01-06 ENCOUNTER — NON-APPOINTMENT (OUTPATIENT)
Age: 39
End: 2024-01-06

## 2024-04-11 NOTE — ED PROVIDER NOTE - CPE EDP CARDIAC NORM
From: Kamilla Johnston  To: Srinivasan Marie  Sent: 4/11/2024 11:19 AM CDT  Subject: Depression     Hi Srinivasan,    I'm having a decline in my mood again. I cry all the time or am angry, feel miserable, just low , no want to do anything, exhausted. I stayed home from work 2 days this week because I just couldn't tolerate the thought of being around others or doing anything other than laying in bed. I had increased the Wellbutrin the last time I saw you. It was ok for a short time but just not as effective as Paroxetine was. Not asking to go back on that due to the wt gain it caused but I need something else.     What do you think about Prozac? Same as Paxil?    Thank you,    Kamilla  
Please advice on the patient message below.    
normal...

## 2024-10-17 ENCOUNTER — APPOINTMENT (OUTPATIENT)
Dept: ORTHOPEDIC SURGERY | Facility: CLINIC | Age: 39
End: 2024-10-17

## 2024-10-17 PROCEDURE — 99212 OFFICE O/P EST SF 10 MIN: CPT

## 2024-10-17 PROCEDURE — 73030 X-RAY EXAM OF SHOULDER: CPT | Mod: RT

## 2024-10-31 PROBLEM — M75.111 NONTRAUMATIC INCOMPLETE TEAR OF RIGHT ROTATOR CUFF: Status: RESOLVED | Noted: 2022-03-28 | Resolved: 2024-10-31

## 2024-10-31 PROBLEM — S46.911A RIGHT SHOULDER STRAIN, INITIAL ENCOUNTER: Status: RESOLVED | Noted: 2023-06-13 | Resolved: 2024-10-31

## 2024-10-31 PROBLEM — S43.439A SLAP TEAR OF SHOULDER: Status: RESOLVED | Noted: 2022-04-13 | Resolved: 2024-10-31

## 2024-10-31 PROBLEM — M25.811 SUBCORACOID IMPINGEMENT OF RIGHT SHOULDER: Status: RESOLVED | Noted: 2022-04-20 | Resolved: 2024-10-31

## 2024-10-31 PROBLEM — M75.21 BICEPS TENDINITIS OF RIGHT UPPER EXTREMITY: Status: RESOLVED | Noted: 2022-04-19 | Resolved: 2024-10-31

## 2024-10-31 PROBLEM — M75.41 SUBACROMIAL IMPINGEMENT OF RIGHT SHOULDER: Status: RESOLVED | Noted: 2022-04-20 | Resolved: 2024-10-31

## 2024-10-31 NOTE — ED ADULT NURSE NOTE - DISTAL EXTREMITY COLOR
New Patient Oncology Nurse Navigator Note     Referral Received: 10/31/24      Referring provider:     Alysha Durham MD     Referring Clinic/Organization: [facility]     Referred to: Benign Hematology    Requested provider (if applicable): First available - did not specify      Evaluation for :   Diagnosis   D68.51 (ICD-10-CM) - Heterozygous factor V Leiden mutation (H)      Clinical History (per Nurse review of records provided):      10/29 VV Herminio:   Right lower leg edema has been constant since 2018 when patient was diagnosed with a DVT.  They completed 6 months worth of anticoagulation and then that was discontinued.  They have had ongoing pain and swelling and was seen by a provider at [facility].  She they were waiting to be contacted to get an ultrasound done but that never happened.  Patient then had a follow-up appointment with their psychiatrist who went ahead and restarted patient on Xarelto.  Chart review shows that patient is heterozygous for factor V Leiden.  Was able to review a heme-onc note from 2023 that recommended that patient have post surgical DVT prophylaxis but there is no recommendation as to whether patient should continue with anticoagulation ongoing.  Will have patient follow-up with heme-onc to find out if they should continue to be on an anticoagulant long-term.  Will also send referral to cardiology to see if patient is a candidate for thrombolytics.  Placing an order for patient to get right lower extremity ultrasound.     9/21/23 Lab:  Comment: IMPRESSION:  1) Activated protein C resistance (APC-R) due  to heterozygous factor V Leiden F5 [id number]; [id number]  (legacy numbering [id number]) variant, a mild risk factor  for venous thromboembolism.    2) No additional identifiable congenital or acquired  thrombotic diathesis (thrombophilia) within limitations of  current test repertoire.    3) If indicated, for additional thrombophilia assessment,  consider testing for  anticardiolipin and/or anti-beta 2  glycoprotein I antibodies (IgG and IgM isotypes).   Records Location: [facility]     Additional testing needed prior to consult:     ?    Referral updates and Plan:     10/31/2024 2:30 PM -  Referral received and reviewed. Per [facility] review, pt requests [facility] location - schedule first available non-mal heme. Sent to NPS.    Jeanne Weston, DEMETRIAN, RN  Hematology/Oncology Nurse Navigator  [facility]  [phone] / [phone]           color consistent with ethnicity/race

## 2024-12-14 NOTE — PRE-ANESTHESIA EVALUATION ADULT - NSANTHTOTALSCORECAL_ENT_A_CORE
[facility] Hospitalist Progress Note      Subjective  In NAD. Андрей today. Keeps asking for his Morphine    Current Meds  Current Facility-Administered Medications   Medication Dose Route Frequency Provider Last Rate Last Admin    ondansetron (ZOFRAN) injection 4 mg  4 mg Intravenous Q8H PRN Trisha Perry MD   4 mg at 12/13/24 1203    polyethylene glycol (MIRALAX) packet 17 g  17 g Oral Daily Trisha Perry MD   17 g at 12/13/24 1157    bisacodyl (DULCOLAX) EC tablet 10 mg  10 mg Oral BID Annika Zhu MD   10 mg at 12/13/24 1800    morphine SR (MS CONTIN) tablet 45 mg  45 mg Oral 2 times per day Trisha Perry MD   45 mg at 12/13/24 2128    [Held by provider] dilTIAZem (TIAZAC,CARDIZEM CD) 24 hr capsule 180 mg  180 mg Oral Daily Edward Whitmore MD   180 mg at 12/12/24 1245    pantoprazole (PROTONIX INJECT) injection 40 mg  40 mg Intravenous 2 times per day Vlad Pino MD   40 mg at 12/13/24 2128    ondansetron (ZOFRAN ODT) disintegrating tablet 8 mg  8 mg Oral Q6H PRN Diogo Hamilton MD        sodium chloride 0.9 % injection 10 mL  10 mL Injection PRN Trisha Perry MD        sodium chloride 0.9 % injection 10 mL  10 mL Injection 2 times per day Trisha Perry MD   10 mL at 12/13/24 2128    magnesium oxide (MAG-OX) tablet 400 mg  400 mg Oral BID Trisha Perry MD   400 mg at 12/13/24 2128    levothyroxine (SYNTHROID, LEVOTHROID) tablet 25 mcg  25 mcg Oral QAM AC Trisha Perry MD   25 mcg at 12/13/24 0514    morphine (IMM REL) (MSIR) tablet 15 mg  15 mg Oral Q6H PRN Trisha Perry MD   15 mg at 12/13/24 0514    hydrOXYzine (ATARAX) tablet 50 mg  50 mg Oral BID Trisha Perry MD   50 mg at 12/13/24 2304    metoPROLOL (LOPRESSOR) injection 2.5 mg  2.5 mg Intravenous Q6H PRN Trisha Perry MD        gabapentin (NEURONTIN) capsule 300 mg  300 mg Oral Q8H Trisha Perry MD   300 mg at 12/14/24 0633    LORazepam (ATIVAN) tablet 2 mg  2 mg Oral Q1H PRN Edward Whitmore MD        Or     LORazepam (ATIVAN) injection 2 mg  2 mg Intravenous Q1H PRN Edward Whitmore MD   2 mg at 12/11/24 1019    Or    LORazepam (ATIVAN) injection 2 mg  2 mg Intramuscular Q1H PRN Edward Whitmore MD        folic acid (FOLATE) tablet 1 mg  1 mg Oral Daily Vlad Pino MD   1 mg at 12/13/24 0844    thiamine (VITAMIN B1) tablet 100 mg  100 mg Oral Daily Vlad Pino MD   100 mg at 12/13/24 0844    digoxin (LANOXIN) tablet 250 mcg  250 mcg Oral Daily Vlad Pino MD   250 mcg at 12/13/24 0844    divalproex (DEPAKOTE) delayed release EC tablet 250 mg  250 mg Oral BID Vlad Pino MD   250 mg at 12/13/24 2143    [Held by provider] furosemide (LASIX) tablet 40 mg  40 mg Oral Daily Vlad Pino MD   40 mg at 12/12/24 1246    lactulose (CHRONULAC) 10 GM/15ML solution 20 g  20 g Oral BID PRN Vlad Pino MD   20 g at 12/12/24 1306    lipase-protease-amylase 36,000-114,000-180,000 units (CREON) per capsule 1 capsule  1 capsule Oral TID WC Vlad Pino MD   1 capsule at 12/13/24 1800    [Held by provider] metoPROLOL tartrate (LOPRESSOR) tablet 50 mg  50 mg Oral 2 times per day Vlad Pino MD   50 mg at 12/12/24 2127    QUEtiapine (SEROquel) tablet 400 mg  400 mg Oral Nightly Vlad Pino MD   400 mg at 12/13/24 2129    [Held by provider] rivaroxaban (XARELTO) tablet 20 mg  20 mg Oral Daily with dinner Trisha Perry MD   20 mg at 12/12/24 1719    spironolactone (ALDACTONE) tablet 25 mg  25 mg Oral Daily Vlad Pino MD   25 mg at 12/13/24 0844    traZODone (DESYREL) tablet 150 mg  150 mg Oral QHS Vlad Pino MD   150 mg at 12/13/24 2128    cefTRIAXone (ROCEPHIN) 2,000 mg in sterile water (preservative free) IV syringe  2,000 mg Intravenous Daily Vlad Pino MD   2,000 mg at 12/13/24 0855    acetaminophen (TYLENOL) tablet 650 mg  650 mg Oral Q4H PRN Vlad Pino MD        Or    acetaminophen  (TYLENOL) suppository 650 mg  650 mg Rectal Q4H PRN Vlad Pino MD        polyethylene glycol (MIRALAX) packet 17 g  17 g Oral Daily PRN Vlad Pino MD   17 g at 12/11/24 1018    melatonin tablet 6 mg  6 mg Oral Nightly PRN Vlad Pino MD        Potassium Standard Replacement Protocol (Levels 3.5 and lower)   Does not apply See Admin Instructions Vlad Pino MD        Magnesium Standard Replacement Protocol   Does not apply See Admin Instructions Vlad Pino MD        Phosphorus Standard Replacement Protocol   Does not apply See Admin Instructions Vlad Pino MD        sodium chloride (NORMAL SALINE) 0.9 % bolus 500 mL  500 mL Intravenous PRN Vlad Pino MD        Potassium Replacement (Levels 3.6 - 4)   Does not apply See Admin Instructions Vlad Pino MD            Labs     Recent Results (from the past 24 hour(s))   GLUCOSE, BEDSIDE - POINT OF CARE    Collection Time: 12/13/24  8:18 AM    Specimen: Blood   Result Value Ref Range    GLUCOSE, BEDSIDE - POINT OF CARE 102 (H) 70 - 99 mg/dL   Lactic Acid, Venous    Collection Time: 12/13/24 10:03 AM    Specimen: Blood, Venous   Result Value Ref Range    Lactate, Venous 2.2 (HH) 0.0 - 2.0 mmol/L   Magnesium    Collection Time: 12/13/24 10:03 AM    Specimen: Blood, Venous   Result Value Ref Range    Magnesium 1.7 1.7 - 2.4 mg/dL   Phosphorus    Collection Time: 12/13/24 10:03 AM    Specimen: Blood, Venous   Result Value Ref Range    Phosphorus 2.0 (L) 2.4 - 4.7 mg/dL   CBC with Automated Differential (performable only)    Collection Time: 12/13/24 10:03 AM    Specimen: Blood, Venous   Result Value Ref Range    WBC 5.4 4.2 - 11.0 K/mcL    RBC 3.37 (L) 4.50 - 5.90 mil/mcL    HGB 8.6 (L) 13.0 - 17.0 g/dL    HCT 27.6 (L) 39.0 - 51.0 %    MCV 81.9 78.0 - 100.0 fl    MCH 25.5 (L) 26.0 - 34.0 pg    MCHC 31.2 (L) 32.0 - 36.5 g/dL    RDW-CV 23.1 (H) 11.0 - 15.0 %    RDW-SD 67.7 (H) 39.0 -  50.0 fL    PLT 97 (L) 140 - 450 K/mcL    NRBC 0 <=0 /100 WBC    Neutrophil, Percent 35 %    Lymphocytes, Percent 47 %    Mono, Percent 15 %    Eosinophils, Percent 2 %    Basophils, Percent 0 %    Immature Granulocytes 1 %    Absolute Neutrophils 1.9 1.8 - 7.7 K/mcL    Absolute Lymphocytes 2.6 1.0 - 4.0 K/mcL    Absolute Monocytes 0.8 0.3 - 0.9 K/mcL    Absolute Eosinophils  0.1 0.0 - 0.5 K/mcL    Absolute Basophils 0.0 0.0 - 0.3 K/mcL    Absolute Immature Granulocytes 0.0 0.0 - 0.2 K/mcL   Prothrombin Time (INR/PT)    Collection Time: 12/13/24 10:08 AM    Specimen: Blood, Venous   Result Value Ref Range    Protime- PT 11.7 9.7 - 11.8 sec    INR 1.1     Light Green Top    Collection Time: 12/13/24 10:22 AM    Specimen: Blood, Venous   Result Value Ref Range    Extra Tube Hold for Add Ons    Gold Top    Collection Time: 12/13/24 10:22 AM    Specimen: Blood, Venous   Result Value Ref Range    Extra Tube Hold for Add Ons    GLUCOSE, BEDSIDE - POINT OF CARE    Collection Time: 12/13/24 11:06 AM    Specimen: Blood   Result Value Ref Range    GLUCOSE, BEDSIDE - POINT OF CARE 96 70 - 99 mg/dL   Comprehensive Metabolic Panel    Collection Time: 12/13/24  1:00 PM    Specimen: Blood, Venous   Result Value Ref Range    Fasting Status      Sodium 137 135 - 145 mmol/L    Potassium 4.3 3.4 - 5.1 mmol/L    Chloride 102 97 - 110 mmol/L    Carbon Dioxide 32 21 - 32 mmol/L    Anion Gap 7 7 - 19 mmol/L    Glucose 114 (H) 70 - 99 mg/dL    BUN 16 6 - 20 mg/dL    Creatinine 0.84 0.67 - 1.17 mg/dL    Glomerular Filtration Rate >90 >=60    BUN/Cr 19 7 - 25    Calcium 8.0 (L) 8.4 - 10.2 mg/dL    Bilirubin, Total 0.4 0.2 - 1.0 mg/dL    GOT/AST 15 <=37 Units/L    GPT/ALT 16 <64 Units/L    Alkaline Phosphatase 224 (H) 45 - 117 Units/L    Albumin 1.9 (L) 3.4 - 5.0 g/dL    Protein, Total 5.7 (L) 6.4 - 8.2 g/dL    Globulin 3.8 2.0 - 4.0 g/dL    A/G Ratio 0.5 (L) 1.0 - 2.4   Blood Culture    Collection Time: 12/13/24  1:00 PM    Specimen: Blood    Result Value Ref Range    Culture, Blood or Bone Marrow No Growth <24 hours    GLUCOSE, BEDSIDE - POINT OF CARE    Collection Time: 12/13/24  4:12 PM    Specimen: Blood   Result Value Ref Range    GLUCOSE, BEDSIDE - POINT OF CARE 105 (H) 70 - 99 mg/dL   Lactic Acid, Venous    Collection Time: 12/13/24  4:48 PM    Specimen: Blood, Venous   Result Value Ref Range    Lactate, Venous 2.6 (HH) 0.0 - 2.0 mmol/L   Light Green Top    Collection Time: 12/13/24  5:08 PM    Specimen: Blood, Venous   Result Value Ref Range    Extra Tube Hold for Add Ons    GLUCOSE, BEDSIDE - POINT OF CARE    Collection Time: 12/13/24  8:38 PM    Specimen: Blood   Result Value Ref Range    GLUCOSE, BEDSIDE - POINT OF CARE 104 (H) 70 - 99 mg/dL       Imaging    XR CHEST AP OR PA   Final Result   FINDINGS/IMPRESSION:      There is slight increase attenuation lateral aspect left lung base which   may be due to summation artifact of superimposed soft tissues accentuated   by shallow inspiratory effort and leftward rotation; however I cannot   entirely exclude atelectasis, infiltrate and/or small pleural effusion   lateral aspect left lung base. Recommend correlation with clinical   assessment and follow-up chest x-ray as warranted. There is linear   atelectasis or fibrosis left midlung with no significant change when   compared to previous examinations.      Heart size, pulmonary vasculature and the mediastinal contour remain   stable. No evidence of pneumothorax. Mild vascular calcification thoracic   aorta compatible with atherosclerosis.      Surgical clips project over the base of the heart central aspect of the   chest. Overlying EKG leads.               Electronically Signed by: JAMES LAURA M.D.    Signed on: 12/11/2024 6:06 AM    Workstation ID: [id number]      CTA ABDOMEN PELVIS   Final Result      1.   Postsurgical changes within the stomach. Nonspecific thickening within   the distal stomach. Underlying gastritis or gastric ulcer  ulcer is in the   differential. Recommend specialist consultation and direct visualization   with special attention to the antropyloric segment.    2.   Left-sided [place] ventral hernia containing loops of large and   small bowel. Grossly similar appearance is seen within the bowel loops when   compared to prior examination. No definite evidence of active bleeding to   explain patient's symptoms.   3.   Nonspecific thickening of the sigmoid colon may be chronic. Please   correlate clinically.   4.   Rectal fecal retention.   5.   Possible hepatic steatosis.         Electronically Signed by: LANDON BAKER    Signed on: 12/10/2024 10:35 PM    Workstation ID: [id number]           Last Recorded Vitals  Vitals with min/max:    Vital Last Value 24 Hour Range   Temperature 97.5 °F (36.4 °C) (12/14/24 0345) Temp  Min: 97.3 °F (36.3 °C)  Max: 98.1 °F (36.7 °C)   Pulse 80 (12/14/24 0345) Pulse  Min: 70  Max: 95   Respiratory 18 (12/13/24 1613) Resp  Min: 18  Max: 18   Non-Invasive  Blood Pressure 92/66 (12/14/24 0345) BP  Min: 79/52  Max: 101/66   Pulse Oximetry 95 % (12/14/24 0345) SpO2  Min: 94 %  Max: 98 %   Arterial   Blood Pressure   No data recorded        Body mass index is 33.58 kg/m².    PHYSICAL EXAMINATION  General: Alert and Oriented x3, No acute distress. Obese.  HEENT: Normocephalic, Oral mucosa is moist.   Respiratory: Lungs are clear to auscultation, Respirations are non-labored  Cardiovascular: Normal rate, Irregular rhythm  Gastrointestinal: Soft, Non-tender, Normal bowel sounds. Ventral abdominal Hernia nontender, no bleeding.  Musculoskeletal: No tenderness. Trace B Leg edema.   Integumentary: Pallor noted. No rash.   Neurologic: Alert and Oriented x3, No focal deficits, Cranial Nerves II-XII are grossly intact.   Cognition and Speech: Oriented, Speech clear and coherent, Functional cognition intact.   Psychiatric: Calm, Functional cognition intact        Assessment/Plan  Presents to ED due to  bleeding from superficial wound overlying his ventral abdominal hernia that started last night. Per EMS, patient had blood and blood clots in bed and estimate approximately 500 ml of blood loss. Patient adds that he feels like he is withdrawing because he has not taken morphine in a couple of days. In ER was found to be in Afib w RVR, ETOH intoxication.      Superficial Ventral Hernia Wound Bleeding  Chronic abdominal ventral hernia,   Acute Blood Loss Anemia  -bleeding had stopped at this time  -Surgery ff no surgical intervention needed at this time  -hgb trending down. Follow Hgb    ETOH Abuse, ETOH Cirrhosis  ETOW Withdrawal  -Calm on CIWA Protocol      Atrial Fib with RVR  -on home on Xarelto  -cardiology ff on Digoxin  -Cardizem on hold due to low BP    Hypotension  -Hold BP Meds. Cut down dose of Morphine  -sp IVFluids    Thrombocytopenia  --follow     Chronic Pains on chronic morphine,   Opioid Dependence  - on chronic Home Morphine  -discussed at length with patient rationale of cutting down dose of his Mosphine (low BP)  -Consult Chemical Dependency      Hypothyroid  -Re-started Levothyroxine     Chronic Pancreatic insufficiency  History of Billroth II gastric bypass   Hemorrhoids; GERD  -GI ff     Hx DVT  -On Xarelto    Probable Pseudobacteremia  -Blood cxs with Staph hominis  -repeat blood cxs. on empiric IV Abx     Seizures  -On seizure precautions     Depression, Anxiety  -Continue meds. Hydroxyzine     Non-Adherence with medical regimen  -Importance of adherence with medical  regimen discussed with patient    Chronic Debilitation, Obesity BMI 31.87  -PT/OT eval, Fall precautions   -lives in a Group home        DVT Prophylaxis  Xarelto on hold today due to hgb trending down       Code Status    Code Status: Full Resuscitation      Disposition: Continue management as outlined above.     PCP- Destinee Dennis MD    Findings and plans discussed at length with patient and verbalized understanding  and agreement.    Trisha Perry MD  12/14/2024 7:47 AM   3

## 2025-05-21 ENCOUNTER — NON-APPOINTMENT (OUTPATIENT)
Age: 40
End: 2025-05-21

## 2025-05-22 PROBLEM — M25.512 LEFT SHOULDER PAIN, UNSPECIFIED CHRONICITY: Status: ACTIVE | Noted: 2025-05-22

## 2025-05-23 ENCOUNTER — APPOINTMENT (OUTPATIENT)
Dept: ORTHOPEDIC SURGERY | Facility: CLINIC | Age: 40
End: 2025-05-23
Payer: COMMERCIAL

## 2025-05-23 DIAGNOSIS — M25.512 PAIN IN LEFT SHOULDER: ICD-10-CM

## 2025-05-23 DIAGNOSIS — M75.22 BICIPITAL TENDINITIS, LEFT SHOULDER: ICD-10-CM

## 2025-05-23 PROCEDURE — 99213 OFFICE O/P EST LOW 20 MIN: CPT

## 2025-05-23 PROCEDURE — 73030 X-RAY EXAM OF SHOULDER: CPT | Mod: LT

## 2025-05-23 RX ORDER — MELOXICAM 15 MG/1
15 TABLET ORAL
Qty: 30 | Refills: 1 | Status: ACTIVE | COMMUNITY
Start: 2025-05-23 | End: 1900-01-01

## (undated) DEVICE — DRSG WEBRIL 6"

## (undated) DEVICE — DRAPE TOWEL BLUE 17" X 24"

## (undated) DEVICE — SHAVER BLADE S&N FULL RADIUS CURVED 3.5MM (BEIGE)

## (undated) DEVICE — GLV 7.5 PROTEXIS (WHITE)

## (undated) DEVICE — ARTHREX MULTIFIRE SCORPION NEEDLE

## (undated) DEVICE — ELCTR BOVIE TIP CLEANER SCRATCH PAD

## (undated) DEVICE — NSA-STRYKER VIDEO TOWER: Type: DURABLE MEDICAL EQUIPMENT

## (undated) DEVICE — BUR S&N NOTCHBLASTER ELITE 4MM STRAIGHT (LILAC)

## (undated) DEVICE — CANNULA DRY-DOC 7X95MM

## (undated) DEVICE — BUR S&N STONECUTTER ELITE 4MM STRAIGHT (MAROON)

## (undated) DEVICE — ARTHREX STAR SLEEVE W VELCRO

## (undated) DEVICE — S&N ARTHROCARE KIT FOR 2.8MM Q-FIX IMPLANT

## (undated) DEVICE — GOWN XL EXTRA LONG

## (undated) DEVICE — NDL HYPO REGULAR BEVEL 22G X 1.5" (TURQUOISE)

## (undated) DEVICE — TUBING LINVATEC ARTHROSCOPY IN/OUTFLOW

## (undated) DEVICE — SOL IRR BAG NS 0.9% 3000ML

## (undated) DEVICE — DRSG STERISTRIPS 0.5 X 4"

## (undated) DEVICE — DRAPE 3/4 SHEET W REINFORCEMENT 56X77"

## (undated) DEVICE — DRSG CURITY GAUZE SPONGE 4 X 4" 12-PLY

## (undated) DEVICE — NDL HYPO SAFE 18G X 1.5" (PINK)

## (undated) DEVICE — BLADE SCALPEL SAFETYLOCK #10

## (undated) DEVICE — SHAVER BLADE S&N FULL RADIUS 4.5MM STRAIGHT (YELLOW)

## (undated) DEVICE — DRAPE SURGICAL #1010

## (undated) DEVICE — ARTHREX STAR SLEEVE COBAN

## (undated) DEVICE — DRAPE INSTRUMENT POUCH 6.75" X 11"

## (undated) DEVICE — DRSG WEBRIL 4"

## (undated) DEVICE — DRSG COBAN 6"

## (undated) DEVICE — TUBING SUCTION 20FT

## (undated) DEVICE — PREP CHLORAPREP HI-LITE ORANGE 26ML

## (undated) DEVICE — SUT MAXON 1 36" GS-24

## (undated) DEVICE — BLADE SCALPEL SAFETYLOCK #11

## (undated) DEVICE — SLV COMPRESSION KNEE MED

## (undated) DEVICE — ELCTR BOVIE PENCIL HANDPIECE

## (undated) DEVICE — SHAVER BLADE S&N ORBIT INCISOR 4.5MM (LIME GREEN)

## (undated) DEVICE — DRAPE 1/2 SHEET 40X57"

## (undated) DEVICE — BLADE SCALPEL SAFETYLOCK #15

## (undated) DEVICE — SHAVER BLADE S&N FULL RADIUS BONECUTTER PLATINUM 4.5MM (YELLOW)

## (undated) DEVICE — SHAVER BLADE S&N FULL RADIUS BONECUTTER PLATINUM 5.5MM (ORANGE)

## (undated) DEVICE — SUT PDS II 1 27" CT

## (undated) DEVICE — CANNULA LINVATEC CLEAR FLEXIBLE 6.5X73MM

## (undated) DEVICE — SUT FIBERWIRE #2 38" STRAND 1 BLUE 1 WHITE/BLACK

## (undated) DEVICE — DRAPE SPLIT SHEET 77" X 108"

## (undated) DEVICE — DRAPE U 47X51" LF STERILE

## (undated) DEVICE — HOOD FLYTE STRYKER HELMET SHIELD

## (undated) DEVICE — LAP PAD 18 X 18"

## (undated) DEVICE — S&N ARTHROCARE WAND TURBOVAC 90 DEGREE

## (undated) DEVICE — PACK SHOULDER ARTHROSCOPY

## (undated) DEVICE — DRAPE IOBAN 23" X 23"

## (undated) DEVICE — SPECIMEN CONTAINER 100ML

## (undated) DEVICE — NDL SPINAL 18G X 3.5" (PINK)

## (undated) DEVICE — SHAVER BLADE S&N INCISOR 4.5MM STRAIGHT (LIME GREEN)

## (undated) DEVICE — GLV 8 PROTEXIS (BLUE)

## (undated) DEVICE — Device

## (undated) DEVICE — BASIN SET DOUBLE

## (undated) DEVICE — SUT PASSER CONMED SUPERSHUTTLE

## (undated) DEVICE — DRSG COMBINE 5X9"

## (undated) DEVICE — WARMING BLANKET LOWER ADULT

## (undated) DEVICE — DRAPE MAYO STAND 30"

## (undated) DEVICE — SHAVER BLADE S&N FULL RADIUS 5.5MM STRAIGHT (ORANGE)